# Patient Record
Sex: FEMALE | Race: ASIAN | NOT HISPANIC OR LATINO | Employment: FULL TIME | URBAN - METROPOLITAN AREA
[De-identification: names, ages, dates, MRNs, and addresses within clinical notes are randomized per-mention and may not be internally consistent; named-entity substitution may affect disease eponyms.]

---

## 2018-11-07 ENCOUNTER — OFFICE VISIT (OUTPATIENT)
Dept: INTERNAL MEDICINE CLINIC | Facility: CLINIC | Age: 53
End: 2018-11-07
Payer: COMMERCIAL

## 2018-11-07 VITALS
HEART RATE: 75 BPM | DIASTOLIC BLOOD PRESSURE: 82 MMHG | WEIGHT: 135.8 LBS | HEIGHT: 64 IN | OXYGEN SATURATION: 97 % | SYSTOLIC BLOOD PRESSURE: 100 MMHG | BODY MASS INDEX: 23.18 KG/M2

## 2018-11-07 DIAGNOSIS — M25.532 LEFT WRIST PAIN: Primary | ICD-10-CM

## 2018-11-07 DIAGNOSIS — Z12.39 SCREENING FOR MALIGNANT NEOPLASM OF BREAST: ICD-10-CM

## 2018-11-07 DIAGNOSIS — G89.29 CHRONIC LEFT-SIDED LOW BACK PAIN WITHOUT SCIATICA: ICD-10-CM

## 2018-11-07 DIAGNOSIS — Z00.00 HEALTHCARE MAINTENANCE: ICD-10-CM

## 2018-11-07 DIAGNOSIS — M54.50 CHRONIC LEFT-SIDED LOW BACK PAIN WITHOUT SCIATICA: ICD-10-CM

## 2018-11-07 PROCEDURE — 99386 PREV VISIT NEW AGE 40-64: CPT | Performed by: NURSE PRACTITIONER

## 2018-11-07 PROCEDURE — 99204 OFFICE O/P NEW MOD 45 MIN: CPT | Performed by: NURSE PRACTITIONER

## 2018-11-07 PROCEDURE — 3008F BODY MASS INDEX DOCD: CPT | Performed by: NURSE PRACTITIONER

## 2018-11-07 RX ORDER — PHENOL 1.4 %
600 AEROSOL, SPRAY (ML) MUCOUS MEMBRANE 2 TIMES DAILY WITH MEALS
COMMUNITY

## 2018-11-07 NOTE — PROGRESS NOTES
Assessment/Plan:       Diagnoses and all orders for this visit:    Left wrist pain  -     diclofenac sodium (VOLTAREN) 1 %; Apply 2 g topically 4 (four) times a day    Screening for malignant neoplasm of breast  -     Mammo screening bilateral w cad; Future    Chronic left-sided low back pain without sciatica  -     Ambulatory referral to Physical Therapy; Future    Healthcare maintenance    Other orders  -     calcium carbonate (OS-ARMANDO) 600 MG tablet; Take 600 mg by mouth 2 (two) times a day with meals  -     Glucosamine-Chondroitin (MOVE FREE PO); Take by mouth        Start physical therapy  Use voltaren cream for pain  nsaid prn      Subjective:      Patient ID: Shantel Boss is a 47 y o  female  2 days of left wrist swelling and pain from carrying and lifting at work    Also co heartburn/acid indigestion    Knee pain from playing badminton as well as lower back/right buttock pain    She does stretch before playing badminton        The following portions of the patient's history were reviewed and updated as appropriate: allergies, current medications, past family history, past medical history, past social history, past surgical history and problem list     Review of Systems   Constitutional: Negative  HENT: Negative  Eyes: Negative  Respiratory: Negative  Cardiovascular: Negative  Gastrointestinal: Negative  Musculoskeletal: Negative  Neurological: Negative  History reviewed  No pertinent family history  History reviewed  No pertinent past medical history  Social History     Social History    Marital status: Unknown     Spouse name: N/A    Number of children: N/A    Years of education: N/A     Occupational History    Not on file       Social History Main Topics    Smoking status: Never Smoker    Smokeless tobacco: Never Used    Alcohol use No    Drug use: No    Sexual activity: Not on file     Other Topics Concern    Not on file     Social History Narrative    No narrative on file       Current Outpatient Prescriptions:     calcium carbonate (OS-ARMANDO) 600 MG tablet, Take 600 mg by mouth 2 (two) times a day with meals, Disp: , Rfl:     Glucosamine-Chondroitin (MOVE FREE PO), Take by mouth, Disp: , Rfl:     diclofenac sodium (VOLTAREN) 1 %, Apply 2 g topically 4 (four) times a day, Disp: 1 Tube, Rfl: 0  Allergies   Allergen Reactions    Sulfa Antibiotics Rash     Objective:      /82 (BP Location: Right arm, Patient Position: Sitting, Cuff Size: Adult)   Pulse 75   Ht 5' 3 5" (1 613 m) Comment: with shoes  Wt 61 6 kg (135 lb 12 8 oz)   SpO2 97%   BMI 23 68 kg/m²          Physical Exam   Constitutional: She is oriented to person, place, and time  She appears well-developed and well-nourished  HENT:   Head: Normocephalic and atraumatic  Eyes: Pupils are equal, round, and reactive to light  Conjunctivae are normal    Neck: Normal range of motion  Neck supple  Cardiovascular: Normal rate and regular rhythm  Pulmonary/Chest: Effort normal and breath sounds normal    Abdominal: Soft  Bowel sounds are normal    Musculoskeletal: Normal range of motion  Neurological: She is alert and oriented to person, place, and time  Skin: Skin is warm and dry

## 2018-11-09 NOTE — PATIENT INSTRUCTIONS
Carpal Tunnel Syndrome Exercises   WHAT YOU NEED TO KNOW:   What do I need to know about carpal tunnel syndrome (CTS) exercises? CTS exercises can help relieve pain and increase your range of motion  Your healthcare provider or physical therapist can tell you how often to do the exercises  What exercises can I do? · Finger extensions:  Hold your fingers and thumb close together  Keep them straight  Put a rubber band around the outside of your fingers and thumb  Spread your fingers apart  Then slowly bring them together without letting the rubber band fall off  Repeat 40 times  · Wrist flexor stretch:  Hold your arm out straight  Grasp your fingers with your other hand  Slowly bend the fingers back (palm facing away) until you feel a stretch in your wrist  Hold for 10 seconds  Repeat 5 times  · Wrist extensor stretch:  Hold your arm out straight  Grasp your fingers with your other hand  Slowly bend the fingers and hand down (palm facing you) until you feel a stretch on top of your hand  Hold for 10 seconds  Repeat 5 times  · Wrist curls without weights:  Sit in a chair with your forearm resting on your thigh or a table  With your palm facing down, flex your wrist up 3 inches and slowly lower it  Turn your forearm over and repeat with your palm facing up  Do each exercise 20 times  · Shrugs:  Stand with your arms by your side  Lift your shoulders up to your ears and hold for 1 second  Then pull your shoulders back, pinching your shoulder blades together  Hold for 1 second  Then relax your shoulders  Repeat 20 times  CARE AGREEMENT:   You have the right to help plan your care  Learn about your health condition and how it may be treated  Discuss treatment options with your caregivers to decide what care you want to receive  You always have the right to refuse treatment  The above information is an  only   It is not intended as medical advice for individual conditions or treatments  Talk to your doctor, nurse or pharmacist before following any medical regimen to see if it is safe and effective for you  © 2017 [facility] Information is for End User's use only and may not be sold, redistributed or otherwise used for commercial purposes  All illustrations and images included in CareNotes® are the copyrighted property of A D A M , Inc  or [employer] Mur[employer]

## 2018-11-09 NOTE — PROGRESS NOTES
NAME: Zackery Jiang  AGE: 47 y o  SEX: female  : 1965     DATE: 18    Assessment and Plan     Problem List Items Addressed This Visit     None      Visit Diagnoses     Left wrist pain    -  Primary    Relevant Medications    diclofenac sodium (VOLTAREN) 1 %    Screening for malignant neoplasm of breast        Relevant Orders    Mammo screening bilateral w cad    Chronic left-sided low back pain without sciatica        Relevant Orders    Ambulatory referral to Physical Therapy    Healthcare maintenance                · Patient Counseling:   · Nutrition: Stressed importance of a well balanced diet, moderation of sodium/saturated fat, caloric balance and sufficient intake of fiber  · Dental Health: Discussed daily flossing and brushing and regular dental visits     · Immunizations reviewed  · Discussed benefits of screening   · Discussed the patient's BMI with him    The BMI is in the acceptable range     rto one year    Chief Complaint     Chief Complaint   Patient presents with    Establish Care     New patient    Hand Pain     Pt reports L hand/wrist inflammation/swelling    Fall     Pt reports fall and now there is bump on L buttock area         History of Present Illness     HPI    Well Adult Physical   Patient here for a comprehensive physical exam       Diet and Physical Activity  Diet: well balanced diet  Weight concerns: None, patient's BMI is between 18 5-24 9  Exercise: daily      Depression Screen  PHQ-9 Depression Screening    PHQ-9:    Frequency of the following problems over the past two weeks:       Little interest or pleasure in doing things:  0 - not at all  Feeling down, depressed, or hopeless:  0 - not at all  PHQ-2 Score:  0         General Health  Hearing: Normal:  bilateral  Vision: no vision problems  Dental: regular dental visits        The following portions of the patient's history were reviewed and updated as appropriate: allergies, current medications, past family history, past medical history, past social history, past surgical history and problem list     Review of Systems   Review of Systems   Constitutional: Negative  HENT: Negative  Eyes: Negative  Respiratory: Negative  Cardiovascular: Negative  Gastrointestinal: Negative  Musculoskeletal: Negative  Neurological: Negative  Past Medical History   History reviewed  No pertinent past medical history  Past Surgical History     Past Surgical History:   Procedure Laterality Date     SECTION      POLYPECTOMY      Sinus       Social History     Social History     Social History    Marital status: Unknown     Spouse name: N/A    Number of children: N/A    Years of education: N/A     Social History Main Topics    Smoking status: Never Smoker    Smokeless tobacco: Never Used    Alcohol use No    Drug use: No    Sexual activity: Not Asked     Other Topics Concern    None     Social History Narrative    None         Family History   History reviewed  No pertinent family history  Current Medications     Current Outpatient Prescriptions:     calcium carbonate (OS-ARMANDO) 600 MG tablet, Take 600 mg by mouth 2 (two) times a day with meals, Disp: , Rfl:     Glucosamine-Chondroitin (MOVE FREE PO), Take by mouth, Disp: , Rfl:     diclofenac sodium (VOLTAREN) 1 %, Apply 2 g topically 4 (four) times a day, Disp: 1 Tube, Rfl: 0    Allergies     Allergies   Allergen Reactions    Sulfa Antibiotics Rash       Objective     Vitals:    18 1136   BP: 100/82   Pulse: 75   SpO2: 97%       Physical Exam  Physical Exam   Constitutional: She is oriented to person, place, and time  She appears well-developed and well-nourished  HENT:   Head: Normocephalic and atraumatic  Eyes: Pupils are equal, round, and reactive to light  Conjunctivae are normal    Neck: Normal range of motion  Neck supple  Cardiovascular: Normal rate and regular rhythm      Pulmonary/Chest: Effort normal and breath sounds normal    Abdominal: Soft  Bowel sounds are normal    Neurological: She is alert and oriented to person, place, and time  Skin: Skin is warm and dry  Nursing note and vitals reviewed  Health Maintenance     Health Maintenance   Topic    CRC Screening: Colonoscopy     DTaP,Tdap,and Td Vaccines (1 - Tdap)    PAP SMEAR     Depression Screening PHQ     INFLUENZA VACCINE        Immunization status: up to date and documented    Immunization History   Administered Date(s) Administered    Influenza 10/15/2018

## 2018-12-04 DIAGNOSIS — M25.532 LEFT WRIST PAIN: ICD-10-CM

## 2018-12-12 ENCOUNTER — EVALUATION (OUTPATIENT)
Dept: PHYSICAL THERAPY | Facility: CLINIC | Age: 53
End: 2018-12-12
Payer: COMMERCIAL

## 2018-12-12 DIAGNOSIS — M54.50 CHRONIC LEFT-SIDED LOW BACK PAIN WITHOUT SCIATICA: Primary | ICD-10-CM

## 2018-12-12 DIAGNOSIS — G89.29 CHRONIC LEFT-SIDED LOW BACK PAIN WITHOUT SCIATICA: Primary | ICD-10-CM

## 2018-12-12 PROCEDURE — G8991 OTHER PT/OT GOAL STATUS: HCPCS | Performed by: PHYSICAL THERAPIST

## 2018-12-12 PROCEDURE — G8990 OTHER PT/OT CURRENT STATUS: HCPCS | Performed by: PHYSICAL THERAPIST

## 2018-12-12 PROCEDURE — 97110 THERAPEUTIC EXERCISES: CPT | Performed by: PHYSICAL THERAPIST

## 2018-12-12 PROCEDURE — 97161 PT EVAL LOW COMPLEX 20 MIN: CPT | Performed by: PHYSICAL THERAPIST

## 2018-12-12 PROCEDURE — 97535 SELF CARE MNGMENT TRAINING: CPT | Performed by: PHYSICAL THERAPIST

## 2018-12-12 NOTE — PROGRESS NOTES
PT Evaluation     Today's date: 2018  Patient name: Pattie Aleman  : 1965  MRN: [id number]  Referring provider: JOSÉ MIGUEL Márquez  Dx:   Encounter Diagnosis     ICD-10-CM    1  Chronic left-sided low back pain without sciatica M54 5 Ambulatory referral to Physical Therapy    G89 29                   Assessment  Assessment details: The patient presents with s/s consistent with low back pain with a core stabilization preference  The patient demonstrates poor core strength, activation, and endurance, limited lumbar vertebral mobility, and pain at end range hip and lumbar ROM  The patient would benefit from skilled therapy to address her deficits through neuro-retraining and strengthening of the core muscles, lumbar/ right hip stretching, lumbar mobilizations, IASTM to b/L QL and high level laser for pain management as needed  Impairments: abnormal muscle tone, abnormal or restricted ROM, impaired physical strength, pain with function and poor posture   Understanding of Dx/Px/POC: fair   Prognosis: fair    Goals  STG: To be completed in 4 weeks  1  The patient will report no more than 4/10 pain during all adls  2  The patient is compliant with her HEP  3  The patient will increase LE strength to 4/5 MMT when standing for 30 minutes or more  LTG: To be completed in 8 weeks    1  The patient will report no more than 1/10 pain during all adls  2  The patient will play a game of [employer] without pain higher than a 1/10 in the low back  3  The patient will increase LE strength to 4+/5 MMT when standing for more than 1 hour         Plan  Patient would benefit from: skilled physical therapy  Planned modality interventions: thermotherapy: hydrocollator packs and cryotherapy  Other planned modality interventions: high level laser  Planned therapy interventions: joint mobilization, manual therapy, neuromuscular re-education, patient education, postural training, strengthening, stretching, therapeutic activities, therapeutic exercise, therapeutic training and home exercise program  Frequency: 2x week  Duration in weeks: 8  Plan of Care beginning date: 2018  Plan of Care expiration date: 2019  Treatment plan discussed with: patient        Subjective Evaluation    History of Present Illness  Date of onset: 2018  Mechanism of injury: Shantel Boss is a 47 y o  female who presents with chronic left-sided low back pain for the past 7-8 years with a recent exacerbation after falling while playing [employer]  The patient denies parasthesias or trouble sleeping at night  The patient currently struggles with standing for more than one hour, bending backward, and playing [employer]  PMH is insignificant         Recurrent probem    Quality of life: fair    Pain  Current pain ratin  At best pain ratin  At worst pain ratin  Quality: dull ache  Relieving factors: rest and medications  Aggravating factors: standing  Progression: worsening      Diagnostic Tests  No diagnostic tests performed  Treatments  Previous treatment: chiropractic  Current treatment: physical therapy  Patient Goals  Patient goals for therapy: return to sport/leisure activities          Objective     Neurological Testing     Sensation     Lumbar   Left   Intact: light touch    Right   Intact: light touch    Active Range of Motion     Lumbar   Extension: [facility]    Additional Active Range of Motion Details  Lumbar:  Flexion- WFL  R Lateral Flexion- fingertips 6 cm above fibular head*  L Lateral Flexion- fingertips 2 cm above fibular head    *Pain noted    Passive Range of Motion     Additional Passive Range of Motion Details  Hamstring: L normal R pain during last 10 degrees  Piriformis: mild tightness b/L     Strength/Myotome Testing     Left Hip   Planes of Motion   Flexion: 4-  Abduction: 4-  Adduction: 4    Right Hip   Planes of Motion   Flexion: 4-  Abduction: 4-  Adduction: 4    Left Knee   Flexion: 4+  Extension: 4+    Right Knee Flexion: 4+  Extension: 4+    Left Ankle/Foot   Dorsiflexion: 5    Right Ankle/Foot   Dorsiflexion: 5    Muscle Activation     Additional Muscle Activation Details  Abd: 4/5 MMT  Abd Endurance: 6 seconds    Tests     Additional Tests Details  Lumbar vertebrae: L3-S1 hypomobile          Precautions: none    Daily Treatment Diary     Manual              IASTM L QL             L2-S1 Gr III Mobs                                                         Exercise Diary  12/12            TA Set 10"x10            TA set c Marches 1x10ea            Bridges 1x15            Clamshells             R LTR 10"x5            Reverse Pball             S/L SLR             Step-Ups  8"/                                                                                                                                                                           Modalities

## 2018-12-14 ENCOUNTER — HOSPITAL ENCOUNTER (OUTPATIENT)
Dept: RADIOLOGY | Age: 53
Discharge: HOME | End: 2018-12-14
Payer: COMMERCIAL

## 2018-12-14 VITALS — BODY MASS INDEX: 23.92 KG/M2 | WEIGHT: 130 LBS | HEIGHT: 62 IN

## 2018-12-14 DIAGNOSIS — Z12.39 SCREENING FOR MALIGNANT NEOPLASM OF BREAST: ICD-10-CM

## 2018-12-14 PROCEDURE — 77067 SCR MAMMO BI INCL CAD: CPT

## 2018-12-20 ENCOUNTER — OFFICE VISIT (OUTPATIENT)
Dept: PHYSICAL THERAPY | Facility: CLINIC | Age: 53
End: 2018-12-20
Payer: COMMERCIAL

## 2018-12-20 DIAGNOSIS — G89.29 CHRONIC LEFT-SIDED LOW BACK PAIN WITHOUT SCIATICA: Primary | ICD-10-CM

## 2018-12-20 DIAGNOSIS — M54.50 CHRONIC LEFT-SIDED LOW BACK PAIN WITHOUT SCIATICA: Primary | ICD-10-CM

## 2018-12-20 PROCEDURE — 97112 NEUROMUSCULAR REEDUCATION: CPT | Performed by: PHYSICAL THERAPIST

## 2018-12-20 PROCEDURE — 97110 THERAPEUTIC EXERCISES: CPT | Performed by: PHYSICAL THERAPIST

## 2018-12-20 NOTE — PROGRESS NOTES
Daily Note     Today's date: 2018  Patient name: Dustin Rodriguez  : 1965  MRN: [id number]  Referring provider: JOSÉ MIGUEL Pinto  Dx:   Encounter Diagnosis     ICD-10-CM    1  Chronic left-sided low back pain without sciatica M54 5     G89 29                   Subjective: The patient reports stiffness and soreness in the left lumbar region this morning  She reports compliance with her HEP  Objective: See treatment diary below      Assessment: Patient demonstrated good tolerance for core strengthening exercises  Moderate tone noted in left QL during manuals  Plan: Continue per plan of care  Progress treatment as tolerated          Precautions: none    Daily Treatment Diary     Manual              IASTM L QL  8 min           L2-S1 Gr III Mobs   1x20 ea                                                      Exercise Diary             TA Set 10"x10 10"x15           TA set c Marches 1x10ea 2x12 ea           Bridges 1x15 2x10           Clamshells  RTB 3x20           R LTR 10"x5 10"x5           Reverse Pball  3x10           S/L SLR  2x10           B Step-Ups  8"/ 2x10                                                                                                                                                                           Modalities

## 2018-12-24 ENCOUNTER — OFFICE VISIT (OUTPATIENT)
Dept: PHYSICAL THERAPY | Facility: CLINIC | Age: 53
End: 2018-12-24
Payer: COMMERCIAL

## 2018-12-24 DIAGNOSIS — G89.29 CHRONIC LEFT-SIDED LOW BACK PAIN WITHOUT SCIATICA: Primary | ICD-10-CM

## 2018-12-24 DIAGNOSIS — M54.50 CHRONIC LEFT-SIDED LOW BACK PAIN WITHOUT SCIATICA: Primary | ICD-10-CM

## 2018-12-24 PROCEDURE — 97112 NEUROMUSCULAR REEDUCATION: CPT | Performed by: PHYSICAL THERAPIST

## 2018-12-24 PROCEDURE — 97110 THERAPEUTIC EXERCISES: CPT | Performed by: PHYSICAL THERAPIST

## 2018-12-24 NOTE — PROGRESS NOTES
Daily Note     Today's date: 2018  Patient name: Lore Ross  : 1965  MRN: [id number]  Referring provider: JOSÉ MIGUEL Turner  Dx:   Encounter Diagnosis     ICD-10-CM    1  Chronic left-sided low back pain without sciatica M54 5     G89 29                   Subjective: The patient reports pain in the right hip after playing tennis but no pain in the back  No pain currently  Objective: See treatment diary below      Assessment: Patient demonstrated increased endurance with core exercises  Moderate tone still noted along the left QL  Plan: Continue per plan of care  Progress treatment as tolerated          Precautions: none    Daily Treatment Diary     Manual             IASTM L QL  8 min 8 min          L2-S1 Gr III Mobs   1x20 ea 1x20 ea                                                     Exercise Diary            TA Set 10"x10 10"x15 10"x15          TA set c Marches 1x10ea 2x12 ea 3x10 ea          Bridges 1x15 2x10 3x10          Clamshells  RTB 3x10 RTB 3x12          R LTR 10"x5 10"x5 10"x5          Reverse Pball  3x10 3x10          S/L SLR  2x10 3x10          B Step-Ups  8"/ 2x10 8" 3x10                                                                                                                                                                          Modalities

## 2019-01-14 ENCOUNTER — OFFICE VISIT (OUTPATIENT)
Dept: PHYSICAL THERAPY | Facility: CLINIC | Age: 54
End: 2019-01-14
Payer: COMMERCIAL

## 2019-01-14 DIAGNOSIS — M54.50 CHRONIC LEFT-SIDED LOW BACK PAIN WITHOUT SCIATICA: Primary | ICD-10-CM

## 2019-01-14 DIAGNOSIS — G89.29 CHRONIC LEFT-SIDED LOW BACK PAIN WITHOUT SCIATICA: Primary | ICD-10-CM

## 2019-01-14 PROCEDURE — 97110 THERAPEUTIC EXERCISES: CPT | Performed by: PHYSICAL THERAPIST

## 2019-01-14 PROCEDURE — 97140 MANUAL THERAPY 1/> REGIONS: CPT | Performed by: PHYSICAL THERAPIST

## 2019-01-14 PROCEDURE — 97112 NEUROMUSCULAR REEDUCATION: CPT | Performed by: PHYSICAL THERAPIST

## 2019-01-14 NOTE — PROGRESS NOTES
Daily Note     Today's date: 2019  Patient name: Perla Patino  : 1965  MRN: [id number]  Referring provider: JOSÉ MIGUEL James  Dx:   Encounter Diagnosis     ICD-10-CM    1  Chronic left-sided low back pain without sciatica M54 5     G89 29                   Subjective: The patient returns to PT after a two weeks hiatus while on vacation  The patient notes she twisted her ankle while away and now complains of ankle and low back pain  She reported sitting aggravated her symptoms specifically when driving  She still notes pain with playing [email or web address] and when in any position for more than one hour  Objective: See treatment diary below      Assessment: Patient exhibited moderate left QL tone  Good tolerance to strengthening progressions despite hiatus  Clamshells added to patient's HEP and theraband administered  The patient asked to decrease to 1x/wk due to time constraints  Patient educated in importance of continuing with HEP at home with decreased PT visits  Plan: Continue per plan of care  Progress treatment as tolerated          Precautions: none    Daily Treatment Diary     Manual            IASTM L QL  8 min 8 min 8 min         L2-S1 Gr III Mobs   1x20 ea 1x20 ea 1x20 ea                                                    Exercise Diary           TA Set 10"x10 10"x15 10"x15 Sit 10"x 15         TA set c Marches 1x10ea 2x12 ea 3x10 ea 3x15 ea         Bridges 1x15 2x10 3x10 3x12         Clamshells  RTB 3x10 RTB 3x12 GTB 3x10         R LTR 10"x5 10"x5 10"x5 10"x5         Reverse Pball  3x10 3x10 3x15         S/L SLR  2x10 3x10 3x10         B Step-Ups  8"/ 2x10 8" 3x10 8" 3x10                                                                                                                                                                         Modalities

## 2019-01-21 ENCOUNTER — APPOINTMENT (OUTPATIENT)
Dept: PHYSICAL THERAPY | Facility: CLINIC | Age: 54
End: 2019-01-21
Payer: COMMERCIAL

## 2019-01-23 ENCOUNTER — OFFICE VISIT (OUTPATIENT)
Dept: PHYSICAL THERAPY | Facility: CLINIC | Age: 54
End: 2019-01-23
Payer: COMMERCIAL

## 2019-01-23 DIAGNOSIS — M54.50 CHRONIC LEFT-SIDED LOW BACK PAIN WITHOUT SCIATICA: Primary | ICD-10-CM

## 2019-01-23 DIAGNOSIS — G89.29 CHRONIC LEFT-SIDED LOW BACK PAIN WITHOUT SCIATICA: Primary | ICD-10-CM

## 2019-01-23 PROCEDURE — 97110 THERAPEUTIC EXERCISES: CPT | Performed by: PHYSICAL THERAPIST

## 2019-01-23 PROCEDURE — 97112 NEUROMUSCULAR REEDUCATION: CPT | Performed by: PHYSICAL THERAPIST

## 2019-01-23 PROCEDURE — 97140 MANUAL THERAPY 1/> REGIONS: CPT | Performed by: PHYSICAL THERAPIST

## 2019-01-23 NOTE — PROGRESS NOTES
Daily Note     Today's date: 2019  Patient name: Bridgette Humphreys  : 1965  MRN: [id number]  Referring provider: JOSÉ MIGUEL Khoury  Dx:   Encounter Diagnosis     ICD-10-CM    1  Chronic left-sided low back pain without sciatica M54 5     G89 29                   Subjective: The patient reports no pain currently noting improvement in back symptoms over the past week  The patient reports mild pain/soreness yesterday but decreased pain overall  Objective: See treatment diary below      Assessment: Patient demonstrated fair lumbopelvic stability during quadruped exercise and increased endurance during exercise progressions today  Moderate tone still noted along left QL  Plan: Continue per plan of care  Progress treatment as tolerated          Precautions: none    Daily Treatment Diary     Manual           IASTM L QL  8 min 8 min 8 min 7 min        L2-S1 Gr III Mobs   1x20 ea 1x20 ea 1x20 ea 1x20 ea                                                   Exercise Diary          TA Set 10"x10 10"x15 10"x15 Sit 10"x 15 Sit 10"x15        TA set c Marches 1x10ea 2x12 ea 3x10 ea 3x15 ea 1#/ 3x10        Bridges 1x15 2x10 3x10 3x12 3x15        Clamshells  RTB 3x10 RTB 3x12 GTB 3x10 S/L GTB 3x15        R LTR 10"x5 10"x5 10"x5 10"x5 10"x5        Reverse Pball  3x10 3x10 3x15 3x18 D/C       S/L SLR  2x10 3x10 3x10 3x10        B Step-Ups  8"/ 2x10 8" 3x10 8" 3x10 8" 3x10        Quadruped: alt legs     1x12 ea                                                                                                                                                           Modalities

## 2019-01-28 ENCOUNTER — OFFICE VISIT (OUTPATIENT)
Dept: PHYSICAL THERAPY | Facility: CLINIC | Age: 54
End: 2019-01-28
Payer: COMMERCIAL

## 2019-01-28 DIAGNOSIS — M54.50 CHRONIC LEFT-SIDED LOW BACK PAIN WITHOUT SCIATICA: Primary | ICD-10-CM

## 2019-01-28 DIAGNOSIS — G89.29 CHRONIC LEFT-SIDED LOW BACK PAIN WITHOUT SCIATICA: Primary | ICD-10-CM

## 2019-01-28 PROCEDURE — 97112 NEUROMUSCULAR REEDUCATION: CPT | Performed by: PHYSICAL THERAPIST

## 2019-01-28 PROCEDURE — 97140 MANUAL THERAPY 1/> REGIONS: CPT | Performed by: PHYSICAL THERAPIST

## 2019-01-28 PROCEDURE — 97110 THERAPEUTIC EXERCISES: CPT | Performed by: PHYSICAL THERAPIST

## 2019-01-28 NOTE — PROGRESS NOTES
Daily Note     Today's date: 2019  Patient name: Lida Guerrero  : 1965  MRN: [id number]  Referring provider: JOSÉ MIGUEL Coffey  Dx:   Encounter Diagnosis     ICD-10-CM    1  Chronic left-sided low back pain without sciatica M54 5     G89 29                   Subjective: The patient reports no pain but mild stiffness in the left QL and glute  She noted stiffness and soreness yesterday while playing [employer]  Objective: See treatment diary below      Assessment: Patient demonstrated improved lumbopelvic stability with quadruped exercise and good tolerance to new core strengthening exercises  The patient's left QL exhibited improved tone compared to last visit but remains tight  Plan: Continue per plan of care  Progress treatment as tolerated          Precautions: none    Daily Treatment Diary     Manual          IASTM L QL  8 min 8 min 8 min 7 min 7 min       L2-S1 Gr III Mobs   1x20 ea 1x20 ea 1x20 ea 1x20 ea 1x20 ea                                                  Exercise Diary         TA Set 10"x10 10"x15 10"x15 Sit 10"x 15 Sit 10"x15 Sit 10"x15       TA set c Marches 1x10ea 2x12 ea 3x10 ea 3x15 ea 1#/ 3x10 1#/ 3x12       Bridges 1x15 2x10 3x10 3x12 3x15 3x15       Clamshells  RTB 3x10 RTB 3x12 GTB 3x10 S/L GTB 3x15 S/L GTB 3x15       R LTR 10"x5 10"x5 10"x5 10"x5 10"x5 10"x5       Reverse Pball  3x10 3x10 3x15 3x18 D/C       S/L SLR  2x10 3x10 3x10 3x10 3x12       B Step-Ups  8"/ 2x10 8" 3x10 8" 3x10 8" 3x10 8" 3x12       Quadruped: alt legs     1x12 ea 2x10 ea       Back Ext Machine      40# 3x10                                                                                                                                             Modalities

## 2019-02-04 ENCOUNTER — OFFICE VISIT (OUTPATIENT)
Dept: PHYSICAL THERAPY | Facility: CLINIC | Age: 54
End: 2019-02-04
Payer: COMMERCIAL

## 2019-02-04 DIAGNOSIS — G89.29 CHRONIC LEFT-SIDED LOW BACK PAIN WITHOUT SCIATICA: Primary | ICD-10-CM

## 2019-02-04 DIAGNOSIS — M54.50 CHRONIC LEFT-SIDED LOW BACK PAIN WITHOUT SCIATICA: Primary | ICD-10-CM

## 2019-02-04 PROCEDURE — 97110 THERAPEUTIC EXERCISES: CPT | Performed by: PHYSICAL THERAPIST

## 2019-02-04 PROCEDURE — 97112 NEUROMUSCULAR REEDUCATION: CPT | Performed by: PHYSICAL THERAPIST

## 2019-02-04 PROCEDURE — 97140 MANUAL THERAPY 1/> REGIONS: CPT | Performed by: PHYSICAL THERAPIST

## 2019-02-11 ENCOUNTER — APPOINTMENT (OUTPATIENT)
Dept: PHYSICAL THERAPY | Facility: CLINIC | Age: 54
End: 2019-02-11
Payer: COMMERCIAL

## 2019-02-13 ENCOUNTER — EVALUATION (OUTPATIENT)
Dept: PHYSICAL THERAPY | Facility: CLINIC | Age: 54
End: 2019-02-13
Payer: COMMERCIAL

## 2019-02-13 DIAGNOSIS — M54.50 CHRONIC LEFT-SIDED LOW BACK PAIN WITHOUT SCIATICA: Primary | ICD-10-CM

## 2019-02-13 DIAGNOSIS — G89.29 CHRONIC LEFT-SIDED LOW BACK PAIN WITHOUT SCIATICA: Primary | ICD-10-CM

## 2019-02-13 PROCEDURE — 97112 NEUROMUSCULAR REEDUCATION: CPT | Performed by: PHYSICAL THERAPIST

## 2019-02-13 PROCEDURE — 97110 THERAPEUTIC EXERCISES: CPT | Performed by: PHYSICAL THERAPIST

## 2019-02-13 NOTE — PROGRESS NOTES
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 and less pain and stiffness while playing [email or web address]  She still demonstrates core and LE weakness, mild tone in the QL, right glute tightness, and pain with functional activities  The patient would benefit from skilled therapy to address her deficits through neuro-retraining and strengthening of the core muscles, lumbar/ right hip stretching, lumbar mobilizations, IASTM to b/L QL and high level laser for pain management as needed  Impairments: abnormal muscle tone, abnormal or restricted ROM, impaired physical strength, pain with function and poor posture   Understanding of Dx/Px/POC: fair   Prognosis: fair    Goals  STG: To be completed in 4 weeks  1  The patient will report no more than 4/10 pain during all adls  met  2  The patient is compliant with her HEP  met  3  The patient will increase LE strength to 4/5 MMT when standing for 30 minutes or more  met    LTG: To be completed in 8 weeks    1  The patient will report no more than 1/10 pain during all adls  2  The patient will play a game of Hightower without pain higher than a 1/10 in the low back  3  The patient will increase LE strength to 4+/5 MMT when standing for more than 1 hour         Plan  Patient would benefit from: skilled physical therapy  Planned modality interventions: thermotherapy: hydrocollator packs and cryotherapy  Other planned modality interventions: high level laser  Planned therapy interventions: joint mobilization, manual therapy, neuromuscular re-education, patient education, postural training, strengthening, stretching, therapeutic activities, therapeutic exercise, therapeutic training and home exercise program  Frequency: 1x week  Duration in weeks: 8  Plan of Care beginning date: 2019  Plan of Care expiration date: 4/10/2019  Treatment plan discussed with: patient        Subjective Evaluation    History of Present Illness  Date of onset: 2018  Mechanism of injury: Raf Maurer is a 47 y o  female who presents with chronic left-sided low back pain for the past 7-8 years with a recent exacerbation after falling while playing Joppel  The patient denies parasthesias or trouble sleeping at night  The patient currently struggles with standing for more than one hour, bending backward, and playing Joppel  PMH is insignificant                 Recurrent probem    Quality of life: fair    Pain  Current pain ratin  At best pain ratin  At worst pain ratin  Quality: dull ache  Relieving factors: rest and medications  Aggravating factors: standing  Progression: improved      Diagnostic Tests  No diagnostic tests performed  Treatments  Previous treatment: chiropractic  Current treatment: physical therapy  Patient Goals  Patient goals for therapy: return to sport/leisure activities          Objective     Neurological Testing     Sensation     Lumbar   Left   Intact: light touch    Right   Intact: light touch    Active Range of Motion     Lumbar   Extension:  [facility]    Additional Active Range of Motion Details  Lumbar:  Flexion- WFL  R Lateral Flexion- fingertips 4 cm above fibular head  L Lateral Flexion- fingertips 2 cm above fibular head        Passive Range of Motion     Additional Passive Range of Motion Details  Hamstring: L normal R pain during last 10 degrees  Piriformis: mild tightness b/L     Strength/Myotome Testing Left Hip   Planes of Motion   Flexion: 4  Abduction: 4  Adduction: 4    Right Hip   Planes of Motion   Flexion: 4  Abduction: 4  Adduction: 4    Left Knee   Flexion: 4+  Extension: 4+    Right Knee   Flexion: 4+  Extension: 4+    Left Ankle/Foot   Dorsiflexion: 5    Right Ankle/Foot   Dorsiflexion: 5    Muscle Activation     Additional Muscle Activation Details  Abd: 4+/5 MMT  Abd Endurance: 12 seconds    Tests     Additional Tests Details  Lumbar vertebrae: L3-S1 hypomobile          Precautions: none    Daily Treatment Diary     Manual   12/20 12/24 1/14 1/23 1/28 2/4 2/13     IASTM L QL  8 min 8 min 8 min 7 min 7 min 5 min 5 min     L2-S1 Gr III Mobs   1x20 ea 1x20 ea 1x20 ea 1x20 ea 1x20 ea 1x15 ea 1x15 ea      Foam Roller: L glute       3 min 5 min                                   Exercise Diary  12/12 12/20 12/24 1/14 1/23 1/28 2/4 2/13     TA Set 10"x10 10"x15 10"x15 Sit 10"x 15 Sit 10"x15 Sit 10"x15 Sit 10"x10 Sit 10"x10     TA set c Marches 1x10ea 2x12 ea 3x10 ea 3x15 ea 1#/ 3x10 1#/ 3x12 1#/ 3x15 Dead bugs 1#/ 3x10     Bridges 1x15 2x10 3x10 3x12 3x15 3x15 3x18 3x18     Clamshells  RTB 3x10 RTB 3x12 GTB 3x10 S/L GTB 3x15 S/L GTB 3x15 S/L GTB 3x15 S/L GTB 3x18     R LTR 10"x5 10"x5 10"x5 10"x5 10"x5 10"x5 10"x5 10"x5     Reverse Pball  3x10 3x10 3x15 3x18 D/C       S/L SLR  2x10 3x10 3x10 3x10 3x12 3x12 3x12     B Step-Ups  8"/ 2x10 8" 3x10 8" 3x10 8" 3x10 8" 3x12 10" 2x10 10" 2x10     Quadruped: alt legs     1x12 ea 2x10 ea 2x10 ea 3x10 ea     Back Ext Machine      40# 3x10 40# 3x10 45# 3x10                                                                                                                                           Modalities

## 2019-02-18 ENCOUNTER — OFFICE VISIT (OUTPATIENT)
Dept: PHYSICAL THERAPY | Facility: CLINIC | Age: 54
End: 2019-02-18
Payer: COMMERCIAL

## 2019-02-18 DIAGNOSIS — G89.29 CHRONIC LEFT-SIDED LOW BACK PAIN WITHOUT SCIATICA: Primary | ICD-10-CM

## 2019-02-18 DIAGNOSIS — M54.50 CHRONIC LEFT-SIDED LOW BACK PAIN WITHOUT SCIATICA: Primary | ICD-10-CM

## 2019-02-18 PROCEDURE — 97110 THERAPEUTIC EXERCISES: CPT | Performed by: PHYSICAL THERAPIST

## 2019-02-18 PROCEDURE — 97112 NEUROMUSCULAR REEDUCATION: CPT | Performed by: PHYSICAL THERAPIST

## 2019-02-25 ENCOUNTER — OFFICE VISIT (OUTPATIENT)
Dept: PHYSICAL THERAPY | Facility: CLINIC | Age: 54
End: 2019-02-25
Payer: COMMERCIAL

## 2019-02-25 DIAGNOSIS — G89.29 CHRONIC LEFT-SIDED LOW BACK PAIN WITHOUT SCIATICA: Primary | ICD-10-CM

## 2019-02-25 DIAGNOSIS — M54.50 CHRONIC LEFT-SIDED LOW BACK PAIN WITHOUT SCIATICA: Primary | ICD-10-CM

## 2019-02-25 PROCEDURE — 97112 NEUROMUSCULAR REEDUCATION: CPT | Performed by: PHYSICAL THERAPIST

## 2019-02-25 PROCEDURE — 97110 THERAPEUTIC EXERCISES: CPT | Performed by: PHYSICAL THERAPIST

## 2019-02-25 NOTE — PROGRESS NOTES
Daily Note     Today's date: 2019  Patient name: Rashad Basurto  : 1965  MRN: [id number]  Referring provider: JOSÉ MIGUEL Terrell  Dx:   Encounter Diagnosis     ICD-10-CM    1  Chronic left-sided low back pain without sciatica M54 5     G89 29                   Subjective: The patient reports no pain while playing badminton this past week and only two episodes of pain while sitting for longer periods of time  Objective: See treatment diary below      Assessment: Reviewed the importance of maintaining a TA set and good posture while sitting  Good response to core progressions and improved core endurance noted during exercises  Plan: Continue per plan of care  Progress treatment as tolerated              Precautions: none    Daily Treatment Diary     Manual      IASTM L QL  8 min 8 min 8 min 7 min 7 min 5 min 5 min 5 min 5 min   L2-S1 Gr III Mobs   1x20 ea 1x20 ea 1x20 ea 1x20 ea 1x20 ea 1x15 ea 1x15 ea  1x15 ea 1x15 ea   Foam Roller: L glute       3 min 5 min 5 min 5 min                                 Exercise Diary     TA Set 10"x10 10"x15 10"x15 Sit 10"x 15 Sit 10"x15 Sit 10"x15 Sit 10"x10 Sit 10"x10 Sit 10"x10 Sit 10"x15   TA set c Marches 1x10ea 2x12 ea 3x10 ea 3x15 ea 1#/ 3x10 1#/ 3x12 1#/ 3x15 Dead bugs 1#/ 3x10 Dead bugs 1#/ 3x10 Dead bugs 1#/ 3x12   Bridges 1x15 2x10 3x10 3x12 3x15 3x15 3x18 3x18 3x18 3x20   Clamshells  RTB 3x10 RTB 3x12 GTB 3x10 S/L GTB 3x15 S/L GTB 3x15 S/L GTB 3x15 S/L GTB 3x18 S/L GTB 3x18 S/L GTB 3x18   R LTR 10"x5 10"x5 10"x5 10"x5 10"x5 10"x5 10"x5 10"x5 10"x5 10"x5   Reverse Pball  3x10 3x10 3x15 3x18 D/C       S/L SLR  2x10 3x10 3x10 3x10 3x12 3x12 3x12 3x15 3xx15   B Step-Ups  8"/ 2x10 8" 3x10 8" 3x10 8" 3x10 8" 3x12 10" 2x10 10" 2x10 10" 2x12 10" 2x12   Quadruped: alt legs     1x12 ea 2x10 ea 2x10 ea 3x10 ea 3x10 ea 3x10 ea   Back Ext Machine      40# 3x10 40# 3x10 45# 3x10 50# 3x10 50# 3x10   L Piriformis St           20"x3 20"x3                                                                                                                            Modalities

## 2019-03-04 ENCOUNTER — OFFICE VISIT (OUTPATIENT)
Dept: PHYSICAL THERAPY | Facility: CLINIC | Age: 54
End: 2019-03-04
Payer: COMMERCIAL

## 2019-03-04 DIAGNOSIS — M54.50 CHRONIC LEFT-SIDED LOW BACK PAIN WITHOUT SCIATICA: Primary | ICD-10-CM

## 2019-03-04 DIAGNOSIS — G89.29 CHRONIC LEFT-SIDED LOW BACK PAIN WITHOUT SCIATICA: Primary | ICD-10-CM

## 2019-03-04 PROCEDURE — 97110 THERAPEUTIC EXERCISES: CPT | Performed by: PHYSICAL THERAPIST

## 2019-03-04 PROCEDURE — 97112 NEUROMUSCULAR REEDUCATION: CPT | Performed by: PHYSICAL THERAPIST

## 2019-03-04 NOTE — PROGRESS NOTES
Daily Note     Today's date: 3/4/2019  Patient name: Lore Ross  : 1965  MRN: [id number]  Referring provider: JOSÉ MIGUEL Turner  Dx:   Encounter Diagnosis     ICD-10-CM    1  Chronic left-sided low back pain without sciatica M54 5     G89 29                   Subjective: The patient reports soreness in the left ITB and piriformis but no pain over the past week  Objective: See treatment diary below      Assessment: Patient demonstrated mild to moderate QL tone today  Patient continues to steadily increase core stability and notes less incidents of pain each week  Plan: Continue per plan of care  Progress treatment as tolerated              Precautions: none    Daily Treatment Diary     Manual  3/4            IASTM L QL 5 min            L2-S1 Gr III Mobs  1x15 ea            Foam Roller: L glute 5 min                                          Exercise Diary  3/4            TA Set Sit 10"x10            Dead bugs 1#/ 3x12            Bridges 3x20            Clamshells S/L GTB 3x18            R LTR 10"x5            L Piriformis St  20"x3            S/L SLR 3x15            B Step-Ups 10" 2x12            Quadruped: alt UE/LE 3x10            Back Ext Machine 50# 3x12                                                                                                                                                  Modalities

## 2019-03-12 ENCOUNTER — OFFICE VISIT (OUTPATIENT)
Dept: PHYSICAL THERAPY | Facility: CLINIC | Age: 54
End: 2019-03-12
Payer: COMMERCIAL

## 2019-03-12 DIAGNOSIS — M54.50 CHRONIC LEFT-SIDED LOW BACK PAIN WITHOUT SCIATICA: Primary | ICD-10-CM

## 2019-03-12 DIAGNOSIS — G89.29 CHRONIC LEFT-SIDED LOW BACK PAIN WITHOUT SCIATICA: Primary | ICD-10-CM

## 2019-03-12 PROCEDURE — 97530 THERAPEUTIC ACTIVITIES: CPT | Performed by: PHYSICAL THERAPIST

## 2019-03-12 PROCEDURE — 97110 THERAPEUTIC EXERCISES: CPT | Performed by: PHYSICAL THERAPIST

## 2019-03-12 PROCEDURE — 97112 NEUROMUSCULAR REEDUCATION: CPT | Performed by: PHYSICAL THERAPIST

## 2019-03-12 NOTE — PROGRESS NOTES
Daily Note     Today's date: 3/12/2019  Patient name: Brooks Taylor  : 1965  MRN: [id number]  Referring provider: JOSÉ MIGUEL Geller  Dx:   Encounter Diagnosis     ICD-10-CM    1  Chronic left-sided low back pain without sciatica M54 5     G89 29                   Subjective: The patient reports improvement in symptoms over the last week with putting a stool under her feet at work as suggested  She notes only one episode of pain and soreness on Thursday  No pain currently  Objective: See treatment diary below      Assessment: Patient demonstrated normal vertebral mobility today but moderate tone in left QL today  The patient continues to exhibit increased core strengthening translating to improved sitting tolerance at work  Plan: Continue per plan of care  Progress treatment as tolerated              Precautions: none    Daily Treatment Diary     Manual  3/4 3/11           IASTM L QL 5 min 5 min           L2-S1 Gr III Mobs  1x15 ea 1x15 ea           Foam Roller: L glute 5 min 5 min                                         Exercise Diary  3/4 3/11           TA Set Sit 10"x10 Sit 10"x10           Dead bugs 1#/ 3x12 1#/ 3x12           Bridges 3x20 3x22           S/L Clamshells  GTB 3x18 GTB 3x18           R LTR 10"x5 10"x5           L Piriformis St  20"x3 20"x3           S/L SLR 3x15 3x15           B Step-Ups 10" 2x12 10" 2x12           Quadruped: alt UE/LE 3x10 10"x10           Back Ext Machine 50# 3x12 55# 3x10                                                                                                                                                 Modalities

## 2019-03-25 ENCOUNTER — OFFICE VISIT (OUTPATIENT)
Dept: PHYSICAL THERAPY | Facility: CLINIC | Age: 54
End: 2019-03-25
Payer: COMMERCIAL

## 2019-03-25 ENCOUNTER — APPOINTMENT (OUTPATIENT)
Dept: PHYSICAL THERAPY | Facility: CLINIC | Age: 54
End: 2019-03-25
Payer: COMMERCIAL

## 2019-03-25 DIAGNOSIS — M54.50 CHRONIC LEFT-SIDED LOW BACK PAIN WITHOUT SCIATICA: Primary | ICD-10-CM

## 2019-03-25 DIAGNOSIS — G89.29 CHRONIC LEFT-SIDED LOW BACK PAIN WITHOUT SCIATICA: Primary | ICD-10-CM

## 2019-03-25 PROCEDURE — 97112 NEUROMUSCULAR REEDUCATION: CPT | Performed by: PHYSICAL THERAPIST

## 2019-03-25 PROCEDURE — 97110 THERAPEUTIC EXERCISES: CPT | Performed by: PHYSICAL THERAPIST

## 2019-03-25 NOTE — PROGRESS NOTES
Daily Note     Today's date: 3/25/2019  Patient name: Rashad Basurto  : 1965  MRN: [id number]  Referring provider: JOSÉ MIGUEL Terrell  Dx:   Encounter Diagnosis     ICD-10-CM    1  Chronic left-sided low back pain without sciatica M54 5     G89 29                   Subjective: The patient reports mild pain with a 5-6 hour plane ride but no pain over the past two weeks  She did note some stiffness in the glute after playing bad[employer] and mild soreness in the leg but no other complaints  Objective: See treatment diary below      Assessment: Patient demonstrated improve glute and QL tone and increased core endurance during strengthening exercises  The patient complained of mild right hamstring pain; she was educated on performing HS stretch at home  Discussed possible discharge in the next 1-3 visits pending symptoms; patient agreeable  Plan: Continue per plan of care  Progress treatment as tolerated              Precautions: none    Daily Treatment Diary     Manual  3/4 3/11 3/25          IASTM L QL 5 min 5 min 5 min          L2-S1 Gr III Mobs  1x15 ea 1x15 ea 1x15 ea          Foam Roller: L glute 5 min 5 min 5 min                                        Exercise Diary  3/4 3/11 3/25          TA Set Sit 10"x10 Sit 10"x10 Sit 5"x15          Dead bugs 1#/ 3x12 1#/ 3x12 1#/ 3x15          Bridges 3x20 3x22 3x22          S/L Clamshells  GTB 3x18 GTB 3x18 GTB 3x18          R LTR 10"x5 10"x5 np          L Piriformis St  20"x3 20"x3 20"x3          S/L SLR 3x15 3x15 3x15          B Step-Ups 10" 2x12 10" 2x12           Quadruped: alt UE/LE 3x10 10"x10 10"x10          Back Ext Machine 50# 3x12 55# 3x10 55# 3x10                                                                                                                                                Modalities

## 2019-04-01 ENCOUNTER — OFFICE VISIT (OUTPATIENT)
Dept: PHYSICAL THERAPY | Facility: CLINIC | Age: 54
End: 2019-04-01
Payer: COMMERCIAL

## 2019-04-01 DIAGNOSIS — M54.50 CHRONIC LEFT-SIDED LOW BACK PAIN WITHOUT SCIATICA: Primary | ICD-10-CM

## 2019-04-01 DIAGNOSIS — G89.29 CHRONIC LEFT-SIDED LOW BACK PAIN WITHOUT SCIATICA: Primary | ICD-10-CM

## 2019-04-01 PROCEDURE — 97112 NEUROMUSCULAR REEDUCATION: CPT | Performed by: PHYSICAL THERAPIST

## 2019-04-01 PROCEDURE — 97110 THERAPEUTIC EXERCISES: CPT | Performed by: PHYSICAL THERAPIST

## 2019-04-03 ENCOUNTER — APPOINTMENT (OUTPATIENT)
Dept: PHYSICAL THERAPY | Facility: CLINIC | Age: 54
End: 2019-04-03
Payer: COMMERCIAL

## 2020-01-21 DIAGNOSIS — Z23 NEED FOR INFLUENZA VACCINATION: ICD-10-CM

## 2020-01-21 DIAGNOSIS — Z12.4 CERVICAL CANCER SCREENING: ICD-10-CM

## 2020-01-21 DIAGNOSIS — Z23 NEED FOR TETANUS, DIPHTHERIA, AND ACELLULAR PERTUSSIS (TDAP) VACCINE: ICD-10-CM

## 2020-01-21 DIAGNOSIS — Z12.11 COLON CANCER SCREENING: Primary | ICD-10-CM

## 2020-01-22 ENCOUNTER — OFFICE VISIT (OUTPATIENT)
Dept: INTERNAL MEDICINE CLINIC | Facility: CLINIC | Age: 55
End: 2020-01-22
Payer: COMMERCIAL

## 2020-01-22 VITALS
SYSTOLIC BLOOD PRESSURE: 116 MMHG | DIASTOLIC BLOOD PRESSURE: 74 MMHG | OXYGEN SATURATION: 98 % | HEART RATE: 60 BPM | TEMPERATURE: 98.2 F | BODY MASS INDEX: 24.44 KG/M2 | WEIGHT: 133.6 LBS

## 2020-01-22 DIAGNOSIS — Z00.00 WELLNESS EXAMINATION: Primary | ICD-10-CM

## 2020-01-22 DIAGNOSIS — Z12.31 ENCOUNTER FOR SCREENING MAMMOGRAM FOR BREAST CANCER: ICD-10-CM

## 2020-01-22 DIAGNOSIS — Z12.4 PAP SMEAR FOR CERVICAL CANCER SCREENING: ICD-10-CM

## 2020-01-22 PROCEDURE — 99396 PREV VISIT EST AGE 40-64: CPT | Performed by: INTERNAL MEDICINE

## 2020-01-22 NOTE — PROGRESS NOTES
Assessment/Plan:  Consume less red meat to improve cholesterol  TSH was slightly high in 2018 but not performed in 2019  She has no symptoms related to this so will just wait for labs this year  Chest pain-tender to touch, likely muscular  She may take NSAIDs or Tylenol PRN her chest pain or low back pain  She will check her records for Adacel ot Td  Will request records from previous PCP     Problem List Items Addressed This Visit     None      Visit Diagnoses     Wellness examination    -  Primary    Encounter for screening mammogram for breast cancer        Relevant Orders    Mammo screening bilateral w cad    Pap smear for cervical cancer screening        Relevant Orders    Ambulatory referral to Obstetrics / Gynecology            Subjective:      Patient ID: Iliana Keller is a 49 y o  female  HPI  Here for a wellness  Up to date with metabolic screening which is performed at work yearly  Lipids slightly elevated  Due for mammo  Due for pap  She saw gyn in [place] a few years ago  She has had a colonoscopy also in [place]  Regular diet  Regular exercise, plays badminton regularly    The following portions of the patient's history were reviewed and updated as appropriate: allergies, current medications, past medical history, past social history, past surgical history and problem list     Review of Systems   Constitutional: Negative for fatigue, fever and unexpected weight change  HENT: Negative for congestion, sinus pressure, sinus pain and sore throat  Respiratory: Negative for cough, shortness of breath and wheezing  Cardiovascular: Positive for chest pain (left upper chest wall pain, tender to touch)  Negative for palpitations and leg swelling  Gastrointestinal: Negative for abdominal pain, constipation, diarrhea, nausea and vomiting  Genitourinary: Positive for pelvic pain (mild chroinc right sided)  Negative for difficulty urinating, dysuria and vaginal bleeding     Musculoskeletal: Positive for back pain (chronic left sided low back pain for which she has had PT)  Negative for arthralgias and myalgias  Neurological: Negative for dizziness and headaches  Psychiatric/Behavioral: Positive for sleep disturbance  Objective:      /74 (BP Location: Left arm, Patient Position: Sitting, Cuff Size: Standard)   Pulse 60   Temp 98 2 °F (36 8 °C)   Wt 60 6 kg (133 lb 9 6 oz)   SpO2 98%   BMI 24 44 kg/m²          Physical Exam   Constitutional: She is oriented to person, place, and time  She appears well-developed and well-nourished  HENT:   Head: Normocephalic and atraumatic  Right Ear: External ear normal    Left Ear: External ear normal    Mouth/Throat: Oropharynx is clear and moist    Eyes: Conjunctivae are normal    Neck: Neck supple  Cardiovascular: Normal rate, regular rhythm and normal heart sounds  No murmur heard  Pulmonary/Chest: Effort normal and breath sounds normal  No respiratory distress  She has no wheezes  She has no rales  She exhibits tenderness (left upper chest wall)  Abdominal: Soft  She exhibits no distension and no mass  There is no tenderness  There is no rebound and no guarding  Neurological: She is alert and oriented to person, place, and time  Skin: Skin is warm and dry  Psychiatric: She has a normal mood and affect   Her behavior is normal  Judgment and thought content normal

## 2020-06-12 ENCOUNTER — OFFICE VISIT (OUTPATIENT)
Dept: INTERNAL MEDICINE CLINIC | Facility: CLINIC | Age: 55
End: 2020-06-12
Payer: COMMERCIAL

## 2020-06-12 VITALS
HEIGHT: 60 IN | DIASTOLIC BLOOD PRESSURE: 68 MMHG | RESPIRATION RATE: 14 BRPM | SYSTOLIC BLOOD PRESSURE: 118 MMHG | BODY MASS INDEX: 27.21 KG/M2 | WEIGHT: 138.6 LBS | TEMPERATURE: 98 F | OXYGEN SATURATION: 96 % | HEART RATE: 67 BPM

## 2020-06-12 DIAGNOSIS — R21 RASH: ICD-10-CM
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
## 2020-06-12 PROCEDURE — 99213 OFFICE O/P EST LOW 20 MIN: CPT | Performed by: NURSE PRACTITIONER

## 2020-06-12 PROCEDURE — 1036F TOBACCO NON-USER: CPT | Performed by: NURSE PRACTITIONER

## 2020-06-12 PROCEDURE — 3008F BODY MASS INDEX DOCD: CPT | Performed by: NURSE PRACTITIONER

## 2020-06-12 RX ORDER — PREDNISONE 10 MG/1
TABLET ORAL
Qty: 30 TABLET | Refills: 0 | Status: SHIPPED | OUTPATIENT
Start: 2020-06-12 | End: 2020-06-24

## 2020-06-12 RX ORDER — CEPHALEXIN 500 MG/1
500 CAPSULE ORAL EVERY 6 HOURS SCHEDULED
Qty: 28 CAPSULE | Refills: 0 | Status: SHIPPED | OUTPATIENT
Start: 2020-06-12 | End: 2020-06-19

## 2020-07-10 ENCOUNTER — OFFICE VISIT (OUTPATIENT)
Dept: OBGYN CLINIC | Facility: CLINIC | Age: 55
End: 2020-07-10
Payer: COMMERCIAL

## 2020-07-10 VITALS
DIASTOLIC BLOOD PRESSURE: 64 MMHG | HEIGHT: 63 IN | BODY MASS INDEX: 23.74 KG/M2 | SYSTOLIC BLOOD PRESSURE: 110 MMHG | TEMPERATURE: 97.7 F | WEIGHT: 134 LBS

## 2020-07-10 DIAGNOSIS — Z12.4 PAP SMEAR FOR CERVICAL CANCER SCREENING: ICD-10-CM

## 2020-07-10 DIAGNOSIS — N95.0 POSTMENOPAUSAL BLEEDING: Primary | ICD-10-CM

## 2020-07-10 DIAGNOSIS — N84.1 CERVICAL POLYP: ICD-10-CM

## 2020-07-10 PROCEDURE — NC001 PR NO CHARGE: Performed by: OBSTETRICS & GYNECOLOGY

## 2020-07-10 PROCEDURE — 88305 TISSUE EXAM BY PATHOLOGIST: CPT | Performed by: PATHOLOGY

## 2020-07-10 PROCEDURE — 99203 OFFICE O/P NEW LOW 30 MIN: CPT | Performed by: OBSTETRICS & GYNECOLOGY

## 2020-07-10 PROCEDURE — 88175 CYTOPATH C/V AUTO FLUID REDO: CPT | Performed by: OBSTETRICS & GYNECOLOGY

## 2020-07-10 PROCEDURE — 3008F BODY MASS INDEX DOCD: CPT | Performed by: OBSTETRICS & GYNECOLOGY

## 2020-07-10 PROCEDURE — 87624 HPV HI-RISK TYP POOLED RSLT: CPT | Performed by: OBSTETRICS & GYNECOLOGY

## 2020-07-10 PROCEDURE — 58100 BIOPSY OF UTERUS LINING: CPT | Performed by: OBSTETRICS & GYNECOLOGY

## 2020-07-10 PROCEDURE — 1036F TOBACCO NON-USER: CPT | Performed by: OBSTETRICS & GYNECOLOGY

## 2020-07-10 NOTE — PROGRESS NOTES
Assessment/Plan    Diagnoses and all orders for this visit:    Postmenopausal bleeding  -     Tissue Exam - endometrial biopsy    -     Liquid-based pap, diagnostic    Cervical polyp  -     Tissue Exam          Subjective     Norma Dietz is a 48 y o  female here for a problem visit  Patient is complaining of postmenopausal bleeding  She went through menopause 3 years ago  A week ago she had bleeding that lasted 3 to 4 days  She has some right lower quadrant pain at the time as well  She states that ever since she got her  She always had more pain on the right side of her pelvis during menses  She is sexually active  Her last Pap was 3 years ago  She has a history of normal Paps  She did have polyps removed from her cervix in the past as well  She moved here from [place] 2 years ago  Patient Active Problem List   Diagnosis    Bug bite    Rash         Gynecologic History  No LMP recorded  Patient is postmenopausal   Contraception: post menopausal status  Last Pap: 3 years ago per patient     Obstetric History  OB History    Para Term  AB Living   2 2 2     2   SAB TAB Ectopic Multiple Live Births           2      # Outcome Date GA Lbr Omero/2nd Weight Sex Delivery Anes PTL Lv   2 Term 1997    Jose Gonzales   PAM   1 Term 1993    F [id number]-[id number]   PAM         History reviewed  No pertinent past medical history      Past Surgical History:   Procedure Laterality Date     SECTION      POLYPECTOMY      Sinus         Family History   Problem Relation Age of Onset    Hypertension Mother     No Known Problems Father        Social History     Socioeconomic History    Marital status: Unknown     Spouse name: Not on file    Number of children: Not on file    Years of education: Not on file    Highest education level: Not on file   Occupational History    Not on file   Social Needs    Financial resource strain: Not on file    Food insecurity:     Worry: Not on file     Inability: Not on file    Transportation needs:     Medical: Not on file     Non-medical: Not on file   Tobacco Use    Smoking status: Never Smoker    Smokeless tobacco: Never Used   Substance and Sexual Activity    Alcohol use: No    Drug use: No    Sexual activity: Yes     Partners: Male     Birth control/protection: Post-menopausal   Lifestyle    Physical activity:     Days per week: Not on file     Minutes per session: Not on file    Stress: Not on file   Relationships    Social connections:     Talks on phone: Not on file     Gets together: Not on file     Attends Congregation service: Not on file     Active member of club or organization: Not on file     Attends meetings of clubs or organizations: Not on file     Relationship status: Not on file    Intimate partner violence:     Fear of current or ex partner: Not on file     Emotionally abused: Not on file     Physically abused: Not on file     Forced sexual activity: Not on file   Other Topics Concern    Not on file   Social History Narrative    Not on file       Allergies  Sulfa antibiotics    Medications    Current Outpatient Medications:     calcium carbonate (OS-ARMANDO) 600 MG tablet, Take 600 mg by mouth 2 (two) times a day with meals, Disp: , Rfl:     diclofenac sodium (VOLTAREN) 1 %, APPLY 2 GRAMS TO AFFECTED AREA 4 TIMES A DAY, Disp: 100 g, Rfl: 0    Glucosamine-Chondroitin (MOVE FREE PO), Take by mouth, Disp: , Rfl:       Review of Systems  Review of Systems   Constitutional: Negative for chills, fever and unexpected weight change  Respiratory: Negative for cough and shortness of breath  Cardiovascular: Negative for chest pain and palpitations  Gastrointestinal: Negative for abdominal distention, abdominal pain, blood in stool, constipation, nausea and vomiting  Genitourinary: Positive for menstrual problem and vaginal bleeding   Negative for difficulty urinating, dyspareunia, dysuria, flank pain, genital sores, hematuria, pelvic pain, urgency, vaginal discharge and vaginal pain  Neurological: Negative for headaches  Objective     /64 (BP Location: Left arm, Patient Position: Sitting, Cuff Size: Standard)   Temp 97 7 °F (36 5 °C) (Temporal) Comment (Src): infrared  Ht 5' 3" (1 6 m)   Wt 60 8 kg (134 lb)   BMI 23 74 kg/m²         Physical Exam   Constitutional: She is oriented to person, place, and time  She appears well-developed and well-nourished  Genitourinary: Vagina normal and uterus normal  Pelvic exam was performed with patient supine  There is no rash, tenderness or lesion on the right labia  There is no rash, tenderness or lesion on the left labia  No vaginal discharge found  Right adnexum displays tenderness  Right adnexum does not display mass and does not display fullness  Left adnexum does not display mass, does not display tenderness and does not display fullness  Cervix exhibits polyp  Cervix does not exhibit motion tenderness or pinkness  Uterus is not enlarged, tender or irregular (is regular)  Genitourinary Comments: No bladder tenderness  Urethral meatus midline, no masses  Pulmonary/Chest: Effort normal  No respiratory distress  Neurological: She is alert and oriented to person, place, and time  Psychiatric: She has a normal mood and affect  Her behavior is normal    Vitals reviewed        Endometrial biopsy  Date/Time: 7/10/2020 12:25 PM  Performed by: Citlalli Conklin DO  Authorized by: Citlalli Conklin DO     Consent:     Consent obtained:  Written    Consent given by:  Patient    Procedural risks discussed:  Bleeding and infection    Patient questions answered: yes      Patient agrees, verbalizes understanding, and wants to proceed: yes    Indication:     Indications: Post-menopausal bleeding      Chronicity of post-menopausal bleeding:  New    Progression of post-menopausal bleeding:  Resolved  Pre-procedure:     Pre-procedure timeout performed: yes    Procedure:     Procedure: endometrial biopsy with Pipelle      A bivalve speculum was placed in the vagina: yes      Cervix cleaned and prepped: yes      The cervix was dilated: yes      Uterus sounded: yes      Uterus sound depth (cm):  8    Curettes used:  1    Specimen collected: specimen collected and sent to pathology      Patient tolerated procedure well with no complications: yes    Findings:     Uterus size:  <6 weeks    Cervix: normal      Adnexa: normal    Comments:      Cervical polyp grasped with ring forceps and twisted at the base and removed without difficulty    Minimal bleeding post removal

## 2020-07-15 LAB
HPV HR 12 DNA CVX QL NAA+PROBE: NEGATIVE
HPV16 DNA CVX QL NAA+PROBE: NEGATIVE
HPV18 DNA CVX QL NAA+PROBE: NEGATIVE

## 2020-07-15 NOTE — RESULT ENCOUNTER NOTE
Spoke with patient regarding results  Patient will monitor her bleeding  If any further episodes of vaginal bleeding, would recommend a D&C  At this time biopsy and polyp were both negative for malignancy  Pap smear still pending  All questions answered to apparent satisfaction  Patient will call with any vaginal bleeding

## 2020-07-17 LAB
LAB AP GYN PRIMARY INTERPRETATION: NORMAL
Lab: NORMAL

## 2020-11-19 ENCOUNTER — TELEPHONE (OUTPATIENT)
Dept: INTERNAL MEDICINE CLINIC | Facility: CLINIC | Age: 55
End: 2020-11-19

## 2020-11-19 DIAGNOSIS — Z20.822 EXPOSURE TO COVID-19 VIRUS: Primary | ICD-10-CM

## 2020-11-20 DIAGNOSIS — Z20.822 EXPOSURE TO COVID-19 VIRUS: ICD-10-CM

## 2020-11-20 PROCEDURE — U0003 INFECTIOUS AGENT DETECTION BY NUCLEIC ACID (DNA OR RNA); SEVERE ACUTE RESPIRATORY SYNDROME CORONAVIRUS 2 (SARS-COV-2) (CORONAVIRUS DISEASE [COVID-19]), AMPLIFIED PROBE TECHNIQUE, MAKING USE OF HIGH THROUGHPUT TECHNOLOGIES AS DESCRIBED BY CMS-2020-01-R: HCPCS | Performed by: INTERNAL MEDICINE

## 2020-11-21 LAB — SARS-COV-2 RNA SPEC QL NAA+PROBE: NOT DETECTED

## 2021-01-29 ENCOUNTER — OFFICE VISIT (OUTPATIENT)
Dept: INTERNAL MEDICINE CLINIC | Facility: CLINIC | Age: 56
End: 2021-01-29
Payer: COMMERCIAL

## 2021-01-29 VITALS
BODY MASS INDEX: 24.8 KG/M2 | RESPIRATION RATE: 14 BRPM | HEIGHT: 63 IN | OXYGEN SATURATION: 96 % | SYSTOLIC BLOOD PRESSURE: 98 MMHG | WEIGHT: 140 LBS | HEART RATE: 70 BPM | DIASTOLIC BLOOD PRESSURE: 64 MMHG

## 2021-01-29 DIAGNOSIS — Z12.11 SCREEN FOR COLON CANCER: ICD-10-CM

## 2021-01-29 DIAGNOSIS — N95.0 POST-MENOPAUSAL BLEEDING: ICD-10-CM

## 2021-01-29 DIAGNOSIS — Z00.00 LABORATORY EXAM ORDERED AS PART OF ROUTINE GENERAL MEDICAL EXAMINATION: ICD-10-CM

## 2021-01-29 DIAGNOSIS — Z00.00 WELLNESS EXAMINATION: Primary | ICD-10-CM

## 2021-01-29 DIAGNOSIS — L81.9 CHANGE IN PIGMENTED LESION OF FACE: ICD-10-CM

## 2021-01-29 DIAGNOSIS — Z12.31 BREAST CANCER SCREENING BY MAMMOGRAM: ICD-10-CM

## 2021-01-29 DIAGNOSIS — H91.93 BILATERAL HEARING LOSS, UNSPECIFIED HEARING LOSS TYPE: ICD-10-CM
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
## 2021-01-29 PROBLEM — R21 RASH: Status: RESOLVED | Noted: 2020-06-12 | Resolved: 2021-01-29

## 2021-01-29 PROCEDURE — 99396 PREV VISIT EST AGE 40-64: CPT | Performed by: INTERNAL MEDICINE

## 2021-01-29 PROCEDURE — 3008F BODY MASS INDEX DOCD: CPT | Performed by: INTERNAL MEDICINE

## 2021-01-29 PROCEDURE — 1036F TOBACCO NON-USER: CPT | Performed by: INTERNAL MEDICINE

## 2021-01-29 PROCEDURE — 3725F SCREEN DEPRESSION PERFORMED: CPT | Performed by: INTERNAL MEDICINE

## 2021-01-29 NOTE — PROGRESS NOTES
Assessment/Plan:  I will check with [facility] if she had labs in 2020 and if not, she will be called to get it done  I asked her to make an appt with Gyn re: vaginal bleeding  Plan in July was to do a D and C if this happened again  Vaccines discussed-she recalls getting the flu vaccine for this flu season  Shingrix, Tetanus vaccines discussed also     Problem List Items Addressed This Visit     None      Visit Diagnoses     Wellness examination    -  Primary    Laboratory exam ordered as part of routine general medical examination        Relevant Orders    CBC and differential    Comprehensive metabolic panel    HEMOGLOBIN A1C W/ EAG ESTIMATION    Lipid Panel with Direct LDL reflex    TSH, 3rd generation with Free T4 reflex    Post-menopausal bleeding        Relevant Orders    CBC and differential    Comprehensive metabolic panel    TSH, 3rd generation with Free T4 reflex    Ambulatory referral to Gynecology    Breast cancer screening by mammogram        Relevant Orders    Mammo screening bilateral w cad    Change in pigmented lesion of face        Relevant Orders    Ambulatory referral to Dermatology    Ambulatory referral to Dermatology    Bilateral hearing loss, unspecified hearing loss type        Relevant Orders    Ambulatory Referral to Otolaryngology    Screen for colon cancer        Relevant Orders    Cologuard            Subjective:      Patient ID: Helen Caruso is a 48 y o  female  HPI  Wellness  Not sure if she had metabolic screening for work in 2020  Overdue for mammogram  Pap in July when she saw Gyn for bleeding   Endometrial bx normal  She reports vaginal bleeding again over the past 2-3 days although very light  Not sure when she had her colonoscopy  Non smoker  No alcohol use  Regular healthy diet  Regular exercise  Working at [employer] and under stress at work    The following portions of the patient's history were reviewed and updated as appropriate: allergies, current medications, past family history, past medical history, past social history, past surgical history and problem list     Review of Systems   Constitutional: Negative for chills, fatigue, fever and unexpected weight change  HENT: Positive for hearing loss (chronic b/l) and tinnitus (chroinc mild right sided)  Negative for congestion and sore throat  Respiratory: Negative for cough and shortness of breath  Cardiovascular: Negative for chest pain, palpitations and leg swelling  Gastrointestinal: Negative for abdominal pain, constipation, diarrhea, nausea and vomiting  Genitourinary: Positive for pelvic pain (right side for a few days) and vaginal bleeding (for 2-3 days)  Negative for difficulty urinating  Musculoskeletal: Negative for arthralgias and myalgias  Skin:        Brown spot on the left temple she noticed 2months ago   Neurological: Negative for dizziness and headaches  Psychiatric/Behavioral: The patient is nervous/anxious  Objective:      BP 98/64   Pulse 70   Resp 14   Ht 5' 3" (1 6 m)   Wt 63 5 kg (140 lb)   SpO2 96%   BMI 24 80 kg/m²          Physical Exam  Constitutional:       General: She is not in acute distress  Appearance: She is well-developed  She is not toxic-appearing or diaphoretic  HENT:      Head: Normocephalic and atraumatic  Right Ear: External ear normal  There is no impacted cerumen  Left Ear: External ear normal  There is no impacted cerumen  Eyes:      Conjunctiva/sclera: Conjunctivae normal    Neck:      Musculoskeletal: Neck supple  Cardiovascular:      Rate and Rhythm: Normal rate and regular rhythm  Heart sounds: Normal heart sounds  No murmur  Pulmonary:      Effort: Pulmonary effort is normal  No respiratory distress  Breath sounds: Normal breath sounds  No wheezing or rales  Abdominal:      General: There is no distension  Palpations: Abdomen is soft  There is no mass  Tenderness: There is no abdominal tenderness   There is no guarding or rebound  Musculoskeletal:      Right lower leg: No edema  Skin:     General: Skin is warm and dry  Findings: Lesion (slightly raised tan oval lesion on the left temple about 1cm in length) present  Neurological:      Mental Status: She is alert and oriented to person, place, and time  Psychiatric:         Mood and Affect: Mood normal          Behavior: Behavior normal          Thought Content:  Thought content normal          Judgment: Judgment normal

## 2021-03-11 ENCOUNTER — OFFICE VISIT (OUTPATIENT)
Dept: DERMATOLOGY | Facility: CLINIC | Age: 56
End: 2021-03-11
Payer: COMMERCIAL

## 2021-03-11 VITALS — BODY MASS INDEX: 25.3 KG/M2 | HEIGHT: 63 IN | WEIGHT: 142.8 LBS | TEMPERATURE: 97.6 F

## 2021-03-11 DIAGNOSIS — D22.9 MULTIPLE NEVI: Primary | ICD-10-CM

## 2021-03-11 DIAGNOSIS — L81.4 LENTIGO: ICD-10-CM

## 2021-03-11 PROCEDURE — 99203 OFFICE O/P NEW LOW 30 MIN: CPT | Performed by: DERMATOLOGY

## 2021-03-11 NOTE — PATIENT INSTRUCTIONS
MULTIPLE MELANOCYTIC NEVI ("Moles")        Assessment and Plan:  Based on a thorough discussion of this condition and the management approach to it (including a comprehensive discussion of the known risks, side effects and potential benefits of treatment), the patient (family) agrees to implement the following specific plan:  · Reassure benign  · Monitor for changes  · Use sun protection  Apply SPF 30 or higher at least three times a day  Wear sun protecting clothing and hats  Worrisome signs of skin malignancy discussed, questions answered  Regular self-skin check discussed  Advised to call or return to office if patient notices any spots of concern, rapidly growing/changing lesions, bleeding lesions, non-healing lesions  Advised regular SPF use  LENTIGO      Assessment and Plan:  Based on a thorough discussion of this condition and the management approach to it (including a comprehensive discussion of the known risks, side effects and potential benefits of treatment), the patient (family) agrees to implement the following specific plan:  · Reassure benign  · Use sun protection  Apply SPF 30 or higher at least three times a day  Wear sun protecting clothing and hats  What is a lentigo? A lentigo is a pigmented flat or slightly raised lesion with a clearly defined edge  Unlike an ephelis (freckle), it does not fade in the winter months  There are several kinds of lentigo  The name lentigo originally referred to its appearance resembling a small lentil  The plural of lentigo is lentigines, although lentigos is also in common use  Who gets lentigines? Lentigines can affect males and females of all ages and races  Solar lentigines are especially prevalent in fair skinned adults  Lentigines associated with syndromes are present at birth or arise during childhood  What causes lentigines?   Common forms of lentigo are due to exposure to ultraviolet radiation:   Sun damage including sunburn  Indoor tanning    Phototherapy, especially photochemotherapy (PUVA)    Ionizing radiation, eg radiation therapy, can also cause lentigines  Several familial syndromes associated with widespread lentigines originate from mutations in Aristides-MAP kinase, mTOR signaling and PTEN pathways  What are the clinical features of lentigines? Lentigines have been classified into several different types depending on what they look like, where they appear on the body, causative factors, and whether they are associated to other diseases or conditions  Lentigines may be solitary or more often, multiple  Most lentigines are smaller than 5 mm in diameter      Lentigo simplex   A precursor to junctional naevus    Arises during childhood and early adult life    Found on trunk and limbs    Small brown round or oval macule or thin plaque    Jagged or smooth edge    May have a dry surface    May disappear in time  Solar lentigo   A precursor to seborrhoeic keratosis    Found on chronically sun exposed sites such as hands, face, lower legs    May also follow sunburn to shoulders    Yellow, light or dark brown regular or irregular macule or thin plaque    May have a dry surface    Often has moth-eaten outline    Can slowly enlarge to several centimeters in diameter    May disappear, often through the process known as lichenoid keratosis    When atypical in appearance, may be difficult to distinguish from melanoma in situ  Ink spot lentigo   Also known as reticulated lentigo    Few in number compared to solar lentigines    Follows sunburn in very fair skinned individuals    Dark brown to black irregular ink spot-like macule  PUVA lentigo   Similar to ink spot lentigo but follows photochemotherapy (PUVA)    Location anywhere exposed to PUVA  Tanning bed lentigo   Similar to ink spot lentigo but follows indoor tanning    Location anywhere exposed to tanning bed  Radiation lentigo   Occurs in site of irradiation (accidental or therapeutic)    Associated with late-stage radiation dermatitis: epidermal atrophy, subcutaneous fibrosis, keratosis, telangiectasias  Melanotic macule   Mucosal surfaces or adjacent glabrous skin eg lip, vulva, penis, anus    Light to dark brown    Also called mucosal melanosis  Generalised lentigines   Found on any exposed or covered site from early childhood    Small macules may merge to form larger patches    Not associated with a syndrome    Also called lentigines profusa, multiple lentigines  Agminated lentigines   Naevoid eruption of lentigos confined to a single segmental area    Sharp demarcation in midline    May have associated neurological and developmental abnormalities  Patterned lentigines   Inherited tendency to lentigines on face, lips, buttocks, palms, soles    Recognised mainly in people of  ethnicity  Centrofacial neurodysraphic lentiginosis  Associated with mental retardation  Lentiginosis syndromes   Syndromes include LEOPARD/Nixon, Peutz-Jeghers, Laugier-Hunziker, Moynahan, Xeroderma pigmentosum, myxoma syndromes (MILIAN, NAME, Guerrero), Ruvalcaba-Myhre-Contreras, Bannayan-Zonnana syndrome, Cowden disease (multiple hamartoma syndrome )    Inheritance is autosomal dominant; sporadic cases common    Widespread lentigines present at birth or arise in early childhood    Associated with neural, endocrine, and mesenchymal tumors    How is the diagnosis made? Lentigines are usually diagnosed clinically by their typical appearance   Concern regarding possibility of melanoma may lead to:   Dermatoscopy    Diagnostic excision biopsy    Histopathology of a lentigo shows:   Thickened epidermis    An increased number of melanocytes along the basal layer of epidermis    Unlike junctional melanocytic naevus, there are no nests of melanocytes    Increased melanin pigment within the keratinocytes    Additional features depending on type of lentigo    In contrast, an ephelis (freckle) shows sun-induced increased melanin within the keratinocytes, without an increase in number of cells  What is the treatment for lentigines? Most lentigines are left alone  Attempts to lighten them may not be successful  The following approaches are used:   SPF 50+ broad-spectrum sunscreen    Hydroquinone bleaching cream    Alpha hydroxy acids    Vitamin C    Retinoids    Azelaic acid    Chemical peels  Individual lesions can be permanently removed using:   Cryotherapy    Intense pulsed light    Pigment lasers    How can lentigines be prevented? Lentigines associated with exposure ultraviolet radiation can be prevented by very careful sun protection  Clothing is more successful at preventing new lentigines than are sunscreens  What is the outlook for lentigines? Lentigines usually persist  They may increase in number with age and sun exposure  Some in sun-protected sites may fade and disappear

## 2021-03-11 NOTE — PROGRESS NOTES
[address] [facility] Note     Patient Name: Amy Branch  Encounter Date: 3/11/21     Have you been cared for by a [facility] Dermatologist in the last 3 years and, if so, which one? No    · Have you traveled outside of the [address] [place] [address] in the past 3 months or outside of the [facility] area in the last 2 weeks? No     May we call your Preferred Phone number to discuss your specific medical information? Yes     May we leave a detailed message that includes your specific medical information? Yes      Today's Chief Concerns:   Concern #1:  Spot of concern on left temple   Concern #2:      Past Medical History:  Have you personally ever had or currently have any of the following? · Skin cancer (such as Melanoma, Basal Cell Carcinoma, Squamous Cell Carcinoma? (If Yes, please provide more detail)- No  · Eczema: No  · Psoriasis: No  · HIV/AIDS: No  · Hepatitis B or C: No  · Tuberculosis: No  · Systemic Immunosuppression such as Diabetes, Biologic or Immunotherapy, Chemotherapy, Organ Transplantation, Bone Marrow Transplantation (If YES, please provide more detail): No  · Radiation Treatment (If YES, please provide more detail): No  · Any other major medical conditions/concerns? (If Yes, which types)- No    Social History:     What is/was your primary occupation? Teacher     What are your hobbies/past-times? Shari     Family History:  Have any of your "first degree relatives" (parent, brother, sister, or child) had any of the following       · Skin cancer such as Melanoma or Merkel Cell Carcinoma or Pancreatic Cancer? No  · Eczema, Asthma, Hay Fever or Seasonal Allergies: No  · Psoriasis or Psoriatic Arthritis: No  · Do any other medical conditions seem to run in your family? If Yes, what condition and which relatives? No    Current Medications:       No current outpatient medications on file        Review of Systems:  Have you recently had or currently have any of the following? If YES, what are you doing for the problem? · Fever, chills or unintended weight loss: No  · Sudden loss or change in your vision: No  · Nausea, vomiting or blood in your stool: No  · Painful or swollen joints: YES, in hips; goes to physical therapist  · Wheezing or cough: No  · Changing mole or non-healing wound: No  · Nosebleeds: No  · Excessive sweating: No  · Easy or prolonged bleeding? No  · Over the last 2 weeks, how often have you been bothered by the following problems? · Taking little interest or pleasure in doing things: 1 - Not at All  · Feeling down, depressed, or hopeless: 1 - Not at All  · Rapid heartbeat with epinephrine:  No    · FEMALES ONLY:    · Are you pregnant or planning to become pregnant? No  · Are you currently or planning to be nursing or breast feeding? No    · Any known allergies? Allergies   Allergen Reactions    Sulfa Antibiotics Rash         Physical Exam:     Was a chaperone (Derm Clinical Assistant) present throughout the entire Physical Exam? Yes    o Did the Dermatology Team specifically  the patient on the importance of a Full Skin Exam to be sure that nothing is missed clinically?  NO - spot of concern clinic  o Did the patient ultimately request or accept a Full Skin Exam?  NO - spot of concern clinic  o Did the patient specifically refuse to have the areas "under-the-bra" examined by the Dermatologist? No  o Did the patient specifically refuse to have the areas "under-the-underwear" examined by the Dermatologist? No    CONSTITUTIONAL:   Vitals:    03/11/21 1058   Temp: 97 6 °F (36 4 °C)   TempSrc: Tympanic   Weight: 64 8 kg (142 lb 12 8 oz)   Height: 5' 2 5" (1 588 m)           PSYCH: Normal mood and affect  EYES: Normal conjunctiva  ENT: Normal lips and oral mucosa  CARDIOVASCULAR: No edema  RESPIRATORY: Normal respirations  HEME/LYMPH/IMMUNO:  No regional lymphadenopathy except as noted below in "ASSESSMENT AND PLAN BY DIAGNOSIS"    SKIN: FULL ORGAN SYSTEM EXAM   Face Normal except as noted below in Assessment                               Right Leg Normal except as noted below in Assessment     Assessment and Plan by Diagnosis:    History of Present Condition:     Duration:  How long has this been an issue for you?    o  One month ago   Location Affected:  Where on the body is this affecting you? o  Left temple   Quality:  Is there any bleeding, pain, itch, burning/irritation, or redness associated with the skin lesion?    o  Itching   Severity:  Describe any bleeding, pain, itch, burning/irritation, or redness on a scale of 1 to 10 (with 10 being the worst)  o  2   Timing:  Does this condition seem to be there pretty constantly or do you notice it more at specific times throughout the day?    o  Constantly   Context:  Have you ever noticed that this condition seems to be associated with specific activities you do?    o  Denies   Modifying Factors:    o Anything that seems to make the condition worse?    -  Denies  o What have you tried to do to make the condition better? -  Nothing   Associated Signs and Symptoms:  Does this skin lesion seem to be associated with any of the following:  o  SL AMB DERM SIGNS AND SYMPTOMS: Itching and Scratching          MULTIPLE MELANOCYTIC NEVI ("Moles")     Physical Exam:  · Anatomic Location Affected: Face and right lateral leg  · Morphological Description:  Scattered, round to ovoid, symmetrical-appearing, even bordered, skin colored to dark brown macules/papules  · Denies pain, itch, bleeding  No treatments tried  Present for years  Present constantly; no modifying factors which make it worse or better  Denies actively changing or growing moles        Assessment and Plan:  Based on a thorough discussion of this condition and the management approach to it (including a comprehensive discussion of the known risks, side effects and potential benefits of treatment), the patient (family) agrees to implement the following specific plan:  · Reassure benign  · Monitor for changes  · Use sun protection  Apply SPF 30 or higher at least three times a day  Wear sun protecting clothing and hats  Worrisome signs of skin malignancy discussed, questions answered  Regular self-skin check discussed  Advised to call or return to office if patient notices any spots of concern, rapidly growing/changing lesions, bleeding lesions, non-healing lesions  Advised regular SPF use  LENTIGO    Physical Exam:   Anatomic Location Affected:  Left Jehovah's witness   Morphological Description:  Multiple scattered brown to tan evenly pigmented macules    Pertinent Positives:   Pertinent Negatives: Additional History of Present Condition:  Patient complains of itchiness  No treatments tried  Present for months  Reports getting newer lesions with sun exposure  Assessment and Plan:  Based on a thorough discussion of this condition and the management approach to it (including a comprehensive discussion of the known risks, side effects and potential benefits of treatment), the patient (family) agrees to implement the following specific plan:  · Reassure benign  · Use sun protection  Apply SPF 30 or higher at least three times a day  Wear sun protecting clothing and hats  What is a lentigo? A lentigo is a pigmented flat or slightly raised lesion with a clearly defined edge  Unlike an ephelis (freckle), it does not fade in the winter months  There are several kinds of lentigo  The name lentigo originally referred to its appearance resembling a small lentil  The plural of lentigo is lentigines, although lentigos is also in common use  Who gets lentigines? Lentigines can affect males and females of all ages and races  Solar lentigines are especially prevalent in fair skinned adults  Lentigines associated with syndromes are present at birth or arise during childhood  What causes lentigines?   Common forms of lentigo are due to exposure to ultraviolet radiation:   Sun damage including sunburn    Indoor tanning    Phototherapy, especially photochemotherapy (PUVA)    Ionizing radiation, eg radiation therapy, can also cause lentigines  Several familial syndromes associated with widespread lentigines originate from mutations in Aristides-MAP kinase, mTOR signaling and PTEN pathways  What are the clinical features of lentigines? Lentigines have been classified into several different types depending on what they look like, where they appear on the body, causative factors, and whether they are associated to other diseases or conditions  Lentigines may be solitary or more often, multiple  Most lentigines are smaller than 5 mm in diameter      Lentigo simplex   A precursor to junctional naevus    Arises during childhood and early adult life    Found on trunk and limbs    Small brown round or oval macule or thin plaque    Jagged or smooth edge    May have a dry surface    May disappear in time  Solar lentigo   A precursor to seborrhoeic keratosis    Found on chronically sun exposed sites such as hands, face, lower legs    May also follow sunburn to shoulders    Yellow, light or dark brown regular or irregular macule or thin plaque    May have a dry surface    Often has moth-eaten outline    Can slowly enlarge to several centimeters in diameter    May disappear, often through the process known as lichenoid keratosis    When atypical in appearance, may be difficult to distinguish from melanoma in situ  Ink spot lentigo   Also known as reticulated lentigo    Few in number compared to solar lentigines    Follows sunburn in very fair skinned individuals    Dark brown to black irregular ink spot-like macule  PUVA lentigo   Similar to ink spot lentigo but follows photochemotherapy (PUVA)    Location anywhere exposed to PUVA  Tanning bed lentigo   Similar to ink spot lentigo but follows indoor tanning    Location anywhere exposed to tanning bed  Radiation lentigo   Occurs in site of irradiation (accidental or therapeutic)    Associated with late-stage radiation dermatitis: epidermal atrophy, subcutaneous fibrosis, keratosis, telangiectasias  Melanotic macule   Mucosal surfaces or adjacent glabrous skin eg lip, vulva, penis, anus    Light to dark brown    Also called mucosal melanosis  Generalised lentigines   Found on any exposed or covered site from early childhood    Small macules may merge to form larger patches    Not associated with a syndrome    Also called lentigines profusa, multiple lentigines  Agminated lentigines   Naevoid eruption of lentigos confined to a single segmental area    Sharp demarcation in midline    May have associated neurological and developmental abnormalities  Patterned lentigines   Inherited tendency to lentigines on face, lips, buttocks, palms, soles    Recognised mainly in people of  ethnicity  Centrofacial neurodysraphic lentiginosis  Associated with mental retardation  Lentiginosis syndromes   Syndromes include LEOPARD/Parshall, Peutz-Jeghers, Laugier-Hunziker, Moynahan, Xeroderma pigmentosum, myxoma syndromes (MILIAN, NAME, Guerrero), Ruvalcaba-Myhre-Contreras, Bannayan-Zonnana syndrome, Cowden disease (multiple hamartoma syndrome )    Inheritance is autosomal dominant; sporadic cases common    Widespread lentigines present at birth or arise in early childhood    Associated with neural, endocrine, and mesenchymal tumors    How is the diagnosis made? Lentigines are usually diagnosed clinically by their typical appearance   Concern regarding possibility of melanoma may lead to:   Dermatoscopy    Diagnostic excision biopsy    Histopathology of a lentigo shows:   Thickened epidermis    An increased number of melanocytes along the basal layer of epidermis    Unlike junctional melanocytic naevus, there are no nests of melanocytes    Increased melanin pigment within the keratinocytes    Additional features depending on type of lentigo    In contrast, an ephelis (freckle) shows sun-induced increased melanin within the keratinocytes, without an increase in number of cells  What is the treatment for lentigines? Most lentigines are left alone  Attempts to lighten them may not be successful  The following approaches are used:   SPF 50+ broad-spectrum sunscreen    Hydroquinone bleaching cream    Alpha hydroxy acids    Vitamin C    Retinoids    Azelaic acid    Chemical peels  Individual lesions can be permanently removed using:   Cryotherapy    Intense pulsed light    Pigment lasers    How can lentigines be prevented? Lentigines associated with exposure ultraviolet radiation can be prevented by very careful sun protection  Clothing is more successful at preventing new lentigines than are sunscreens  What is the outlook for lentigines? Lentigines usually persist  They may increase in number with age and sun exposure  Some in sun-protected sites may fade and disappear  Lentigos and nevi on today's exam   Discussed benign quality and solar etiology for the lentigos  Discussed need for sun avoidance measures and to return for new or changing lesions      Scribe Attestation    I,:  Neli Foy RN am acting as a scribe while in the presence of the attending physician :       I,:  Travis Feliciano MD personally performed the services described in this documentation    as scribed in my presence :

## 2021-03-13 ENCOUNTER — HOSPITAL ENCOUNTER (OUTPATIENT)
Dept: MAMMOGRAPHY | Facility: HOSPITAL | Age: 56
Discharge: HOME | End: 2021-03-13
Payer: COMMERCIAL

## 2021-03-13 VITALS — WEIGHT: 140 LBS | BODY MASS INDEX: 24.8 KG/M2 | HEIGHT: 63 IN

## 2021-03-13 DIAGNOSIS — Z12.31 BREAST CANCER SCREENING BY MAMMOGRAM: ICD-10-CM

## 2021-03-13 DIAGNOSIS — Z12.31 VISIT FOR SCREENING MAMMOGRAM: ICD-10-CM

## 2021-03-13 PROCEDURE — 77063 BREAST TOMOSYNTHESIS BI: CPT

## 2021-03-13 PROCEDURE — 77067 SCR MAMMO BI INCL CAD: CPT

## 2021-03-16 DIAGNOSIS — R19.5 POSITIVE COLORECTAL CANCER SCREENING USING COLOGUARD TEST: Primary | ICD-10-CM

## 2021-04-09 DIAGNOSIS — Z23 ENCOUNTER FOR IMMUNIZATION: ICD-10-CM

## 2021-04-20 ENCOUNTER — IMMUNIZATIONS (OUTPATIENT)
Dept: FAMILY MEDICINE CLINIC | Facility: HOSPITAL | Age: 56
End: 2021-04-20

## 2021-04-20 DIAGNOSIS — Z23 ENCOUNTER FOR IMMUNIZATION: Primary | ICD-10-CM

## 2021-04-20 PROCEDURE — 91300 SARS-COV-2 / COVID-19 MRNA VACCINE (PFIZER-BIONTECH) 30 MCG: CPT

## 2021-04-20 PROCEDURE — 0001A SARS-COV-2 / COVID-19 MRNA VACCINE (PFIZER-BIONTECH) 30 MCG: CPT

## 2021-04-28 ENCOUNTER — VBI (OUTPATIENT)
Dept: ADMINISTRATIVE | Facility: OTHER | Age: 56
End: 2021-04-28

## 2021-05-03 ENCOUNTER — OFFICE VISIT (OUTPATIENT)
Dept: OBGYN CLINIC | Facility: CLINIC | Age: 56
End: 2021-05-03
Payer: COMMERCIAL

## 2021-05-03 VITALS
BODY MASS INDEX: 25.09 KG/M2 | HEIGHT: 63 IN | SYSTOLIC BLOOD PRESSURE: 116 MMHG | WEIGHT: 141.6 LBS | DIASTOLIC BLOOD PRESSURE: 62 MMHG

## 2021-05-03 DIAGNOSIS — N95.0 POST-MENOPAUSAL BLEEDING: Primary | ICD-10-CM

## 2021-05-03 DIAGNOSIS — R10.2 PELVIC PAIN: ICD-10-CM

## 2021-05-03 PROCEDURE — 99214 OFFICE O/P EST MOD 30 MIN: CPT | Performed by: PHYSICIAN ASSISTANT

## 2021-05-03 PROCEDURE — 3008F BODY MASS INDEX DOCD: CPT | Performed by: PHYSICIAN ASSISTANT

## 2021-05-03 PROCEDURE — 1036F TOBACCO NON-USER: CPT | Performed by: PHYSICIAN ASSISTANT

## 2021-05-03 NOTE — PROGRESS NOTES
Assessment/Plan:  - Reviewed prior episodes of PMB  - Previous normal benign EMB and cervical polyp  - Was recommended at that time if bleeding continues, patient should have D&C  - Pelvic US ordered  - Patient to RTO for surgical consult       Diagnoses and all orders for this visit:    Post-menopausal bleeding  -     Ambulatory referral to Gynecology  -     US pelvis complete w transvaginal; Future    Pelvic pain  -     Ambulatory referral to Gynecology  -     US pelvis complete w transvaginal; Future          Subjective:      Patient ID: Anusha Lopez is a 48 y o  female  Karrie Martinez is a 50YO  female presenting to the office with c/o persistent PMB  Patient reports she is not currently bleeding at this time  The last episode of PMB she had was in 2021 which resolved on its own  She was unable to come in to the office to be seen when the bleeding was active  Patient reports she had moderately heavy bleeding for 2 days and needed to use maxi pads  She reports this bleeding was unprovoked, and not incited by intercourse  Patient describes the bleeding as bright red  She has had 3 episodes of PMB since 2020  In 2020, she had her second reported episode  At that time, she had a cervical polyp removed and an EMB performed  Both results were benign  It was advised to patient that if bleeding continued, she should have a D&C  Patient also reports intermittent random right sided lower abdominal/pelvic pain  She reports this pain has been occurring since she was 16years old when her menses first started  She reports this pain has occurred with her episodes of PMB, but is improving as she gets older  Patient denies fevers, chills, unintentional weight loss               The following portions of the patient's history were reviewed and updated as appropriate: allergies, current medications, past family history, past medical history, past social history, past surgical history and problem list     Review of Systems   Constitutional: Negative for chills, fever and unexpected weight change  Respiratory: Negative for shortness of breath  Cardiovascular: Negative for chest pain  Gastrointestinal: Negative for abdominal pain, diarrhea, nausea and vomiting  Genitourinary: Positive for menstrual problem, pelvic pain (right sided lower cramping) and vaginal discharge  Skin: Negative for rash  Objective:      /62   Ht 5' 3" (1 6 m)   Wt 64 2 kg (141 lb 9 6 oz)   BMI 25 08 kg/m²          Physical Exam  Vitals signs reviewed  Constitutional:       Appearance: Normal appearance  HENT:      Head: Normocephalic and atraumatic  Pulmonary:      Effort: Pulmonary effort is normal    Abdominal:      General: There is no distension  Palpations: Abdomen is soft  Tenderness: There is abdominal tenderness (minimally TTP with deep palpation of RLQ) in the right lower quadrant  There is no guarding or rebound  Genitourinary:     General: Normal vulva  Exam position: Lithotomy position  Pubic Area: No rash  Labia:         Right: No rash or lesion  Left: No rash or lesion  Vagina: Normal  No vaginal discharge, tenderness or lesions  Cervix: No discharge or lesion  Uterus: Normal  Not tender  Adnexa: Left adnexa normal         Right: No mass or tenderness  Left: No mass or tenderness  Comments: No cervical polyps present on exam    Skin:     General: Skin is warm and dry  Findings: No lesion or rash  Neurological:      General: No focal deficit present  Mental Status: She is alert     Psychiatric:         Mood and Affect: Mood normal          Behavior: Behavior normal

## 2021-05-13 ENCOUNTER — IMMUNIZATIONS (OUTPATIENT)
Dept: FAMILY MEDICINE CLINIC | Facility: HOSPITAL | Age: 56
End: 2021-05-13
Payer: COMMERCIAL

## 2021-05-13 ENCOUNTER — HOSPITAL ENCOUNTER (OUTPATIENT)
Dept: ULTRASOUND IMAGING | Facility: HOSPITAL | Age: 56
Discharge: HOME | End: 2021-05-13
Payer: COMMERCIAL

## 2021-05-13 DIAGNOSIS — R10.2 PELVIC PAIN: ICD-10-CM

## 2021-05-13 DIAGNOSIS — Z23 ENCOUNTER FOR IMMUNIZATION: Primary | ICD-10-CM

## 2021-05-13 DIAGNOSIS — N95.0 POST-MENOPAUSAL BLEEDING: ICD-10-CM

## 2021-05-13 PROCEDURE — 76856 US EXAM PELVIC COMPLETE: CPT

## 2021-05-13 PROCEDURE — 91300 SARS-COV-2 / COVID-19 MRNA VACCINE (PFIZER-BIONTECH) 30 MCG: CPT

## 2021-05-13 PROCEDURE — 0002A SARS-COV-2 / COVID-19 MRNA VACCINE (PFIZER-BIONTECH) 30 MCG: CPT

## 2021-05-13 PROCEDURE — 76830 TRANSVAGINAL US NON-OB: CPT

## 2021-06-02 ENCOUNTER — OFFICE VISIT (OUTPATIENT)
Dept: OBGYN CLINIC | Facility: CLINIC | Age: 56
End: 2021-06-02
Payer: COMMERCIAL

## 2021-06-02 VITALS — DIASTOLIC BLOOD PRESSURE: 66 MMHG | BODY MASS INDEX: 25.15 KG/M2 | WEIGHT: 142 LBS | SYSTOLIC BLOOD PRESSURE: 108 MMHG

## 2021-06-02 DIAGNOSIS — N95.0 POSTMENOPAUSAL BLEEDING: Primary | ICD-10-CM

## 2021-06-02 PROCEDURE — 99214 OFFICE O/P EST MOD 30 MIN: CPT | Performed by: OBSTETRICS & GYNECOLOGY

## 2021-06-02 PROCEDURE — 1036F TOBACCO NON-USER: CPT | Performed by: OBSTETRICS & GYNECOLOGY

## 2021-06-05 PROBLEM — N95.0 POSTMENOPAUSAL BLEEDING: Status: ACTIVE | Noted: 2021-06-05

## 2021-06-05 NOTE — H&P (VIEW-ONLY)
Assessment/Plan:   Diagnoses and all orders for this visit:    Postmenopausal bleeding    - reviewed with patient results of US including EMS of 5mm and presence of submucosal leiomyoma  Recommendation for hysteroscopy with D&C discussed for tissue sampling, evaluation of submucosal leiomyoma, and possible removal of leiomyoma  - Reviewed details of the procedure and patient was counseled on risks related to surgical procedure including uterine perforation, inability to complete procedure as planned and need for repeat procedure, fluid overload/electrolyte imbalance, infection, and vaginal bleeding after procedure  Consent was obtained  - surgical scheduling occurred prior to patient's departure and patient scheduled for 21  Surgical booklet and hibiclens wash provided to patient  Subjective:    Patient ID: Dorien Felty is a 55 y o  female  Chief Complaint   Patient presents with    Consult     Pt is here to discuss US results and discuss possible surgery  Jed Don is a 52yo  presenting today for surgical consultation regarding her persisitent PMB  She has had recurrent episodes of postmenopausal bleeding since 2020  She was seen for evaluation and was noted to have benign EMB and a cervical polyp was removed that was also benign  Patient was counseled that if PMB continued, recommendation would be for hysteroscopy and D&C for further evaluation  Patient had recent US, results that are as follows:  US pelvis complete w transvaginal    Narrative: PELVIC ULTRASOUND, COMPLETE    INDICATION:  55years old  N95 0: Postmenopausal bleeding  R10 2: Pelvic and perineal pain  COMPARISON: None    TECHNIQUE:   Transabdominal pelvic ultrasound was performed in sagittal and transverse planes with a curvilinear transducer  Additional transvaginal imaging was performed to better evaluate the endometrium and ovaries    Imaging included volumetric   sweeps as well as traditional still imaging technique  FINDINGS:    UTERUS:  The uterus is anteverted in position, measuring 8 2 x 5 1 x 3 9 cm  Mildly heterogeneous myometrial echotexture  There is a submucosal leiomyoma measuring 1 9 x 1 2 x 2 cm in the posterior aspect of uterine fundus/body  The cervix shows no suspicious abnormality  ENDOMETRIUM:    Normal caliber of 5 mm  Homogeneous and normal in appearance  OVARIES/ADNEXA:  Right ovary: The right ovary is not visualized on transabdominal or transvaginal sonographic examination  No suspicious right adnexal mass  Left ovary:  1 5 x 1 0x 0 9 cm  No suspicious left ovarian abnormality  Doppler flow within normal limits  No suspicious adnexal mass or loculated collections  There is no free fluid  Impression:    1  Mildly heterogeneous uterus with a 1 9 cm submucosal leiomyoma  2   Normal thickness endometrium measuring 5 mm  3   Nonvisualization of right ovary  No suspicious adnexal mass  The following portions of the patient's history were reviewed and updated as appropriate: allergies, current medications, past family history, past medical history, past social history, past surgical history and problem list     Review of Systems   Constitutional: Negative for activity change, appetite change and unexpected weight change  Respiratory: Negative for cough and shortness of breath  Cardiovascular: Negative for chest pain  Gastrointestinal: Negative for abdominal pain, constipation, diarrhea, nausea and vomiting  Genitourinary: Positive for menstrual problem and vaginal bleeding  Negative for difficulty urinating, dyspareunia, frequency and pelvic pain  Musculoskeletal: Negative for back pain  Neurological: Negative for dizziness, weakness, light-headedness and headaches  Psychiatric/Behavioral: Negative            Objective:  /66 (BP Location: Right arm, Patient Position: Sitting, Cuff Size: Standard)   Wt 64 4 kg (142 lb)   BMI 25 15 kg/m² Physical Exam  Constitutional:       General: She is not in acute distress  Appearance: Normal appearance  She is normal weight  She is not diaphoretic  HENT:      Head: Normocephalic and atraumatic  Cardiovascular:      Rate and Rhythm: Normal rate and regular rhythm  Heart sounds: Normal heart sounds  No murmur  No gallop  Pulmonary:      Effort: Pulmonary effort is normal  No respiratory distress  Breath sounds: Normal breath sounds  No wheezing or rhonchi  Musculoskeletal:         General: No tenderness  Right lower leg: No edema  Left lower leg: No edema  Neurological:      Mental Status: She is alert and oriented to person, place, and time  Psychiatric:         Mood and Affect: Mood normal          Behavior: Behavior normal          Thought Content: Thought content normal          Judgment: Judgment normal    Vitals signs and nursing note reviewed

## 2021-06-05 NOTE — PROGRESS NOTES
Assessment/Plan:   Diagnoses and all orders for this visit:    Postmenopausal bleeding    - reviewed with patient results of US including EMS of 5mm and presence of submucosal leiomyoma  Recommendation for hysteroscopy with D&C discussed for tissue sampling, evaluation of submucosal leiomyoma, and possible removal of leiomyoma  - Reviewed details of the procedure and patient was counseled on risks related to surgical procedure including uterine perforation, inability to complete procedure as planned and need for repeat procedure, fluid overload/electrolyte imbalance, infection, and vaginal bleeding after procedure  Consent was obtained  - surgical scheduling occurred prior to patient's departure and patient scheduled for 21  Surgical booklet and hibiclens wash provided to patient  Subjective:    Patient ID: Bianka Luis is a 55 y o  female  Chief Complaint   Patient presents with    Consult     Pt is here to discuss US results and discuss possible surgery  [facility] is a 50yo  presenting today for surgical consultation regarding her persisitent PMB  She has had recurrent episodes of postmenopausal bleeding since 2020  She was seen for evaluation and was noted to have benign EMB and a cervical polyp was removed that was also benign  Patient was counseled that if PMB continued, recommendation would be for hysteroscopy and D&C for further evaluation  Patient had recent US, results that are as follows:  US pelvis complete w transvaginal    Narrative: PELVIC ULTRASOUND, COMPLETE    INDICATION:  55years old  N95 0: Postmenopausal bleeding  R10 2: Pelvic and perineal pain  COMPARISON: None    TECHNIQUE:   Transabdominal pelvic ultrasound was performed in sagittal and transverse planes with a curvilinear transducer  Additional transvaginal imaging was performed to better evaluate the endometrium and ovaries    Imaging included volumetric   sweeps as well as traditional still imaging technique  FINDINGS:    UTERUS:  The uterus is anteverted in position, measuring 8 2 x 5 1 x 3 9 cm  Mildly heterogeneous myometrial echotexture  There is a submucosal leiomyoma measuring 1 9 x 1 2 x 2 cm in the posterior aspect of uterine fundus/body  The cervix shows no suspicious abnormality  ENDOMETRIUM:    Normal caliber of 5 mm  Homogeneous and normal in appearance  OVARIES/ADNEXA:  Right ovary: The right ovary is not visualized on transabdominal or transvaginal sonographic examination  No suspicious right adnexal mass  Left ovary:  1 5 x 1 0x 0 9 cm  No suspicious left ovarian abnormality  Doppler flow within normal limits  No suspicious adnexal mass or loculated collections  There is no free fluid  Impression:    1  Mildly heterogeneous uterus with a 1 9 cm submucosal leiomyoma  2   Normal thickness endometrium measuring 5 mm  3   Nonvisualization of right ovary  No suspicious adnexal mass  The following portions of the patient's history were reviewed and updated as appropriate: allergies, current medications, past family history, past medical history, past social history, past surgical history and problem list     Review of Systems   Constitutional: Negative for activity change, appetite change and unexpected weight change  Respiratory: Negative for cough and shortness of breath  Cardiovascular: Negative for chest pain  Gastrointestinal: Negative for abdominal pain, constipation, diarrhea, nausea and vomiting  Genitourinary: Positive for menstrual problem and vaginal bleeding  Negative for difficulty urinating, dyspareunia, frequency and pelvic pain  Musculoskeletal: Negative for back pain  Neurological: Negative for dizziness, weakness, light-headedness and headaches  Psychiatric/Behavioral: Negative            Objective:  /66 (BP Location: Right arm, Patient Position: Sitting, Cuff Size: Standard)   Wt 64 4 kg (142 lb)   BMI 25 15 kg/m² Physical Exam  Constitutional:       General: She is not in acute distress  Appearance: Normal appearance  She is normal weight  She is not diaphoretic  HENT:      Head: Normocephalic and atraumatic  Cardiovascular:      Rate and Rhythm: Normal rate and regular rhythm  Heart sounds: Normal heart sounds  No murmur  No gallop  Pulmonary:      Effort: Pulmonary effort is normal  No respiratory distress  Breath sounds: Normal breath sounds  No wheezing or rhonchi  Musculoskeletal:         General: No tenderness  Right lower leg: No edema  Left lower leg: No edema  Neurological:      Mental Status: She is alert and oriented to person, place, and time  Psychiatric:         Mood and Affect: Mood normal          Behavior: Behavior normal          Thought Content: Thought content normal          Judgment: Judgment normal    Vitals signs and nursing note reviewed

## 2021-06-08 ENCOUNTER — TELEPHONE (OUTPATIENT)
Dept: GASTROENTEROLOGY | Facility: AMBULARY SURGERY CENTER | Age: 56
End: 2021-06-08

## 2021-06-09 ENCOUNTER — ANESTHESIA EVENT (OUTPATIENT)
Dept: GASTROENTEROLOGY | Facility: AMBULARY SURGERY CENTER | Age: 56
End: 2021-06-09

## 2021-06-09 ENCOUNTER — ANESTHESIA (OUTPATIENT)
Dept: GASTROENTEROLOGY | Facility: AMBULARY SURGERY CENTER | Age: 56
End: 2021-06-09

## 2021-06-09 ENCOUNTER — HOSPITAL ENCOUNTER (OUTPATIENT)
Dept: GASTROENTEROLOGY | Facility: AMBULARY SURGERY CENTER | Age: 56
Setting detail: OUTPATIENT SURGERY
Discharge: HOME | End: 2021-06-09
Attending: COLON & RECTAL SURGERY | Admitting: COLON & RECTAL SURGERY
Payer: COMMERCIAL

## 2021-06-09 VITALS
WEIGHT: 136.8 LBS | TEMPERATURE: 97.5 F | OXYGEN SATURATION: 100 % | HEIGHT: 62 IN | HEART RATE: 51 BPM | RESPIRATION RATE: 20 BRPM | DIASTOLIC BLOOD PRESSURE: 58 MMHG | BODY MASS INDEX: 25.17 KG/M2 | SYSTOLIC BLOOD PRESSURE: 120 MMHG

## 2021-06-09 DIAGNOSIS — Z12.11 SCREEN FOR COLON CANCER: ICD-10-CM

## 2021-06-09 PROCEDURE — G0121 COLON CA SCRN NOT HI RSK IND: HCPCS | Performed by: COLON & RECTAL SURGERY

## 2021-06-09 PROCEDURE — 99243 OFF/OP CNSLTJ NEW/EST LOW 30: CPT | Performed by: COLON & RECTAL SURGERY

## 2021-06-09 RX ORDER — METOCLOPRAMIDE HYDROCHLORIDE 5 MG/ML
INJECTION INTRAMUSCULAR; INTRAVENOUS AS NEEDED
Status: DISCONTINUED | OUTPATIENT
Start: 2021-06-09 | End: 2021-06-09

## 2021-06-09 RX ORDER — ONDANSETRON 2 MG/ML
INJECTION INTRAMUSCULAR; INTRAVENOUS AS NEEDED
Status: DISCONTINUED | OUTPATIENT
Start: 2021-06-09 | End: 2021-06-09

## 2021-06-09 RX ORDER — SODIUM CHLORIDE, SODIUM LACTATE, POTASSIUM CHLORIDE, CALCIUM CHLORIDE 600; 310; 30; 20 MG/100ML; MG/100ML; MG/100ML; MG/100ML
INJECTION, SOLUTION INTRAVENOUS CONTINUOUS PRN
Status: DISCONTINUED | OUTPATIENT
Start: 2021-06-09 | End: 2021-06-09

## 2021-06-09 RX ORDER — PROPOFOL 10 MG/ML
INJECTION, EMULSION INTRAVENOUS AS NEEDED
Status: DISCONTINUED | OUTPATIENT
Start: 2021-06-09 | End: 2021-06-09

## 2021-06-09 RX ORDER — LIDOCAINE HYDROCHLORIDE 10 MG/ML
INJECTION, SOLUTION EPIDURAL; INFILTRATION; INTRACAUDAL; PERINEURAL AS NEEDED
Status: DISCONTINUED | OUTPATIENT
Start: 2021-06-09 | End: 2021-06-09

## 2021-06-09 RX ADMIN — PROPOFOL 20 MG: 10 INJECTION, EMULSION INTRAVENOUS at 09:30

## 2021-06-09 RX ADMIN — SODIUM CHLORIDE, SODIUM LACTATE, POTASSIUM CHLORIDE, AND CALCIUM CHLORIDE: .6; .31; .03; .02 INJECTION, SOLUTION INTRAVENOUS at 09:16

## 2021-06-09 RX ADMIN — PROPOFOL 20 MG: 10 INJECTION, EMULSION INTRAVENOUS at 09:25

## 2021-06-09 RX ADMIN — PROPOFOL 20 MG: 10 INJECTION, EMULSION INTRAVENOUS at 09:27

## 2021-06-09 RX ADMIN — METOCLOPRAMIDE HYDROCHLORIDE 10 MG: 5 INJECTION INTRAMUSCULAR; INTRAVENOUS at 09:16

## 2021-06-09 RX ADMIN — ONDANSETRON 4 MG: 2 INJECTION INTRAMUSCULAR; INTRAVENOUS at 09:16

## 2021-06-09 RX ADMIN — Medication 40 MG: at 09:27

## 2021-06-09 RX ADMIN — LIDOCAINE HYDROCHLORIDE 50 MG: 10 INJECTION, SOLUTION EPIDURAL; INFILTRATION; INTRACAUDAL at 09:21

## 2021-06-09 RX ADMIN — PROPOFOL 20 MG: 10 INJECTION, EMULSION INTRAVENOUS at 09:37

## 2021-06-09 RX ADMIN — PROPOFOL 100 MG: 10 INJECTION, EMULSION INTRAVENOUS at 09:21

## 2021-06-09 RX ADMIN — PROPOFOL 20 MG: 10 INJECTION, EMULSION INTRAVENOUS at 09:33

## 2021-06-09 NOTE — ANESTHESIA PREPROCEDURE EVALUATION
Procedure:  COLONOSCOPY    Relevant Problems   No relevant active problems   Post menopausal bleed           Anesthesia Plan  ASA Score- 2     Anesthesia Type- IV sedation with anesthesia with ASA Monitors  Additional Monitors:   Airway Plan:     Comment: Plan for IV sedation with GETA as back up          Plan Factors-    Chart reviewed  EKG reviewed  Imaging results reviewed  Existing labs reviewed  Patient summary reviewed  Induction- intravenous  Postoperative Plan- Plan for postoperative opioid use  Planned trial extubation    Informed Consent- Anesthetic plan and risks discussed with patient  I personally reviewed this patient with the CRNA  Discussed and agreed on the Anesthesia Plan with the ESTIVEN Cope

## 2021-06-09 NOTE — DISCHARGE INSTRUCTIONS
Colonoscopy   WHAT YOU NEED TO KNOW:   A colonoscopy is a procedure to examine the inside of your colon (intestine) with a scope  Polyps or tissue growths may have been removed during your colonoscopy  It is normal to feel bloated and to have some abdominal discomfort  You should be passing gas  If you have hemorrhoids or you had polyps removed, you may have a small amount of bleeding  DISCHARGE INSTRUCTIONS:   Seek care immediately if:   · You have a large amount of bright red blood in your bowel movements  · Your abdomen is hard and firm and you have severe pain  · You have sudden trouble breathing  Contact your healthcare provider if:   · You develop a rash or hives  · You have a fever within 24 hours of your procedure       · You have not had a bowel movement for 3 days after your procedure  · You have questions or concerns about your condition or care  Activity:   · Do not lift, strain, or run  for 3 days after your procedure  · Rest after your procedure  You have been given medicine to relax you  Do not  drive or make important decisions until the day after your procedure  Return to your normal activity as directed  · Relieve gas and discomfort from bloating  by lying on your right side with a heating pad on your abdomen  You may need to take short walks to help the gas move out  Eat small meals until bloating is relieved  If you had polyps removed: For 7 days after your procedure:  · Do not  take aspirin  · Do not  go on long car rides  Follow up with your healthcare provider as directed:  Write down your questions so you remember to ask them during your visits  © 2017 [address] is for End User's use only and may not be sold, redistributed or otherwise used for commercial purposes  All illustrations and images included in CareNotes® are the copyrighted property of [employer]  or [address]    The above information is an  only  It is not intended as medical advice for individual conditions or treatments  Talk to your doctor, nurse or pharmacist before following any medical regimen to see if it is safe and effective for you

## 2021-06-09 NOTE — ANESTHESIA POSTPROCEDURE EVALUATION
Post-Op Assessment Note    CV Status:  Stable  Pain Score: 0    Pain management: adequate     Mental Status:  Sleepy   Hydration Status:  Euvolemic   PONV Controlled:  Controlled   Airway Patency:  Patent      Post Op Vitals Reviewed: Yes      Staff: CRNA         No complications documented      BP   97/56   Temp     Pulse 46   Resp 12   SpO2 98% RA

## 2021-06-09 NOTE — H&P
History and Physical   Colon and Rectal Surgery   Stiven Akbar 55 y o  female MRN: [id number]  Unit/Bed#:  Encounter: [id number]  21   9:03 AM      No chief complaint on file  ASSESSMENT:  Stiven Akbar is a 55 y o  female who presents, reports no prior colonoscopy on interview today, denies family history colon polyps or cancer  She has colo guard positive 2021  Screening colonoscopy,discussed in a face-to-face, personal, informed consent process, the benefits, alternatives, risks including not limited to bleeding, missed lesion, perforation requiring emergent surgery discussed/understood  PLAN:  Colonoscopy    History of Present Illness   HPI:  Stiven Akbar is a 55 y o  female who presents for screening colonoscopy  Cologuard positive 2021  Denies nausea/vomiting/abdominal pain, change in bowel habits, rectal bleeding, or other constitutional symptoms         Historical Information   Past Medical History:   Diagnosis Date    Bug bite 2020    Rash 2020    Varicella      Past Surgical History:   Procedure Laterality Date     SECTION      POLYPECTOMY      Sinus       Meds/Allergies     (Not in a hospital admission)        Current Outpatient Medications:     calcium carbonate (OS-ARMANDO) 600 MG tablet, Take 600 mg by mouth 2 (two) times a day with meals, Disp: , Rfl:     Glucosamine-Chondroitin (MOVE FREE PO), Take by mouth, Disp: , Rfl:     Allergies   Allergen Reactions    Sulfa Antibiotics Rash         Social History   Social History     Substance and Sexual Activity   Alcohol Use No     Social History     Substance and Sexual Activity   Drug Use No     Social History     Tobacco Use   Smoking Status Never Smoker   Smokeless Tobacco Never Used         Family History:   Family History   Problem Relation Age of Onset    Hypertension Mother     No Known Problems Father     No Known Problems Daughter     No Known Problems Maternal Grandmother     No Known Problems Maternal Grandfather     No Known Problems Paternal Grandmother     No Known Problems Paternal Grandfather     No Known Problems Son          Objective     Current Vitals:   Blood Pressure: 117/73 (06/09/21 0845)  Pulse: (!) 51(sb) (06/09/21 0845)  Temperature: (!) 97 °F (36 1 °C) (06/09/21 0845)  Temp Source: Temporal (06/09/21 0845)  Respirations: 16 (06/09/21 0845)  Height: 5' 2" (157 5 cm) (06/09/21 0845)  Weight - Scale: 62 1 kg (136 lb 12 8 oz) (06/09/21 0845)  SpO2: 98 % (06/09/21 0845)  No intake or output data in the 24 hours ending 06/09/21 0903    Physical Exam:  General:no distress  Eyes:perrla/eomi  ENT:moist mucus membranes  Neck:supple  Pulm:no increased work of breathing  CV:sinus  Abdomen:soft,nontender

## 2021-06-11 ENCOUNTER — PREP FOR PROCEDURE (OUTPATIENT)
Dept: OBGYN CLINIC | Facility: CLINIC | Age: 56
End: 2021-06-11

## 2021-06-11 DIAGNOSIS — N95.0 POSTMENOPAUSAL BLEEDING: Primary | ICD-10-CM

## 2021-06-11 RX ORDER — GABAPENTIN 100 MG/1
100 CAPSULE ORAL ONCE
Status: CANCELLED | OUTPATIENT
Start: 2021-06-11 | End: 2021-06-11

## 2021-06-11 RX ORDER — ACETAMINOPHEN 325 MG/1
975 TABLET ORAL ONCE
Status: CANCELLED | OUTPATIENT
Start: 2021-06-11 | End: 2021-06-11

## 2021-06-18 ENCOUNTER — ANESTHESIA EVENT (OUTPATIENT)
Dept: PERIOP | Facility: AMBULARY SURGERY CENTER | Age: 56
End: 2021-06-18
Payer: COMMERCIAL

## 2021-06-20 NOTE — ANESTHESIA PREPROCEDURE EVALUATION
Procedure:  DILATATION AND CURETTAGE (D&C) WITH HYSTEROSCOPY (MYOSURE) (N/A Uterus)    Relevant Problems   ANESTHESIA (within normal limits)      CARDIO (within normal limits)      ENDO (within normal limits)      GI/HEPATIC (within normal limits)      /RENAL (within normal limits)      HEMATOLOGY (within normal limits)      NEURO/PSYCH (within normal limits)      PULMONARY (within normal limits)      Other   (+) Postmenopausal bleeding      No results found for: WBC, HGB, HCT, MCV, PLT  No results found for: SODIUM, K, CL, CO2, BUN, CREATININE, GLUC, CALCIUM  No results found for: INR, PROTIME  No results found for: HGBA1C         Physical Exam    Airway    Mallampati score: II  TM Distance: >3 FB  Neck ROM: full     Dental   No notable dental hx     Cardiovascular  Cardiovascular exam normal    Pulmonary  Pulmonary exam normal     Other Findings        Anesthesia Plan  ASA Score- 2     Anesthesia Type- general with ASA Monitors  Additional Monitors:   Airway Plan: LMA  Plan Factors-Exercise tolerance (METS): >4 METS  Chart reviewed  Patient summary reviewed  Induction- intravenous  Postoperative Plan-     Informed Consent- Anesthetic plan and risks discussed with patient  I personally reviewed this patient with the CRNA  Discussed and agreed on the Anesthesia Plan with the CRNA  Karen Mandujano

## 2021-06-21 ENCOUNTER — ANESTHESIA (OUTPATIENT)
Dept: PERIOP | Facility: AMBULARY SURGERY CENTER | Age: 56
End: 2021-06-21
Payer: COMMERCIAL

## 2021-06-21 ENCOUNTER — HOSPITAL ENCOUNTER (OUTPATIENT)
Facility: AMBULARY SURGERY CENTER | Age: 56
Setting detail: OUTPATIENT SURGERY
Discharge: HOME | End: 2021-06-21
Attending: OBSTETRICS & GYNECOLOGY | Admitting: OBSTETRICS & GYNECOLOGY
Payer: COMMERCIAL

## 2021-06-21 VITALS
TEMPERATURE: 97.6 F | OXYGEN SATURATION: 97 % | SYSTOLIC BLOOD PRESSURE: 132 MMHG | WEIGHT: 139 LBS | HEIGHT: 62 IN | DIASTOLIC BLOOD PRESSURE: 78 MMHG | HEART RATE: 56 BPM | RESPIRATION RATE: 17 BRPM | BODY MASS INDEX: 25.58 KG/M2

## 2021-06-21 DIAGNOSIS — N95.0 POSTMENOPAUSAL BLEEDING: ICD-10-CM

## 2021-06-21 PROBLEM — Z98.890 S/P D&C (STATUS POST DILATION AND CURETTAGE): Status: ACTIVE | Noted: 2021-06-21

## 2021-06-21 PROCEDURE — 88305 TISSUE EXAM BY PATHOLOGIST: CPT | Performed by: PATHOLOGY

## 2021-06-21 PROCEDURE — 58558 HYSTEROSCOPY BIOPSY: CPT | Performed by: OBSTETRICS & GYNECOLOGY

## 2021-06-21 RX ORDER — HYDROMORPHONE HCL/PF 1 MG/ML
0.2 SYRINGE (ML) INJECTION
Status: DISCONTINUED | OUTPATIENT
Start: 2021-06-21 | End: 2021-06-21 | Stop reason: HOSPADM

## 2021-06-21 RX ORDER — PROPOFOL 10 MG/ML
INJECTION, EMULSION INTRAVENOUS CONTINUOUS PRN
Status: DISCONTINUED | OUTPATIENT
Start: 2021-06-21 | End: 2021-06-21

## 2021-06-21 RX ORDER — EPHEDRINE SULFATE 50 MG/ML
INJECTION INTRAVENOUS AS NEEDED
Status: DISCONTINUED | OUTPATIENT
Start: 2021-06-21 | End: 2021-06-21

## 2021-06-21 RX ORDER — MAGNESIUM HYDROXIDE 1200 MG/15ML
LIQUID ORAL AS NEEDED
Status: DISCONTINUED | OUTPATIENT
Start: 2021-06-21 | End: 2021-06-21 | Stop reason: HOSPADM

## 2021-06-21 RX ORDER — ONDANSETRON 2 MG/ML
INJECTION INTRAMUSCULAR; INTRAVENOUS AS NEEDED
Status: DISCONTINUED | OUTPATIENT
Start: 2021-06-21 | End: 2021-06-21

## 2021-06-21 RX ORDER — FENTANYL CITRATE/PF 50 MCG/ML
25 SYRINGE (ML) INJECTION
Status: DISCONTINUED | OUTPATIENT
Start: 2021-06-21 | End: 2021-06-21 | Stop reason: HOSPADM

## 2021-06-21 RX ORDER — GABAPENTIN 100 MG/1
100 CAPSULE ORAL ONCE
Status: COMPLETED | OUTPATIENT
Start: 2021-06-21 | End: 2021-06-21

## 2021-06-21 RX ORDER — KETOROLAC TROMETHAMINE 30 MG/ML
INJECTION, SOLUTION INTRAMUSCULAR; INTRAVENOUS AS NEEDED
Status: DISCONTINUED | OUTPATIENT
Start: 2021-06-21 | End: 2021-06-21

## 2021-06-21 RX ORDER — ACETAMINOPHEN 325 MG/1
975 TABLET ORAL ONCE
Status: COMPLETED | OUTPATIENT
Start: 2021-06-21 | End: 2021-06-21

## 2021-06-21 RX ORDER — SODIUM CHLORIDE, SODIUM LACTATE, POTASSIUM CHLORIDE, CALCIUM CHLORIDE 600; 310; 30; 20 MG/100ML; MG/100ML; MG/100ML; MG/100ML
INJECTION, SOLUTION INTRAVENOUS CONTINUOUS PRN
Status: DISCONTINUED | OUTPATIENT
Start: 2021-06-21 | End: 2021-06-21

## 2021-06-21 RX ORDER — ACETAMINOPHEN 325 MG/1
650 TABLET ORAL EVERY 6 HOURS PRN
Status: DISCONTINUED | OUTPATIENT
Start: 2021-06-21 | End: 2021-06-21 | Stop reason: HOSPADM

## 2021-06-21 RX ORDER — ONDANSETRON 2 MG/ML
4 INJECTION INTRAMUSCULAR; INTRAVENOUS ONCE AS NEEDED
Status: DISCONTINUED | OUTPATIENT
Start: 2021-06-21 | End: 2021-06-21 | Stop reason: HOSPADM

## 2021-06-21 RX ORDER — PROPOFOL 10 MG/ML
INJECTION, EMULSION INTRAVENOUS AS NEEDED
Status: DISCONTINUED | OUTPATIENT
Start: 2021-06-21 | End: 2021-06-21

## 2021-06-21 RX ORDER — FENTANYL CITRATE 50 UG/ML
INJECTION, SOLUTION INTRAMUSCULAR; INTRAVENOUS AS NEEDED
Status: DISCONTINUED | OUTPATIENT
Start: 2021-06-21 | End: 2021-06-21

## 2021-06-21 RX ORDER — IBUPROFEN 600 MG/1
600 TABLET ORAL EVERY 6 HOURS PRN
Status: DISCONTINUED | OUTPATIENT
Start: 2021-06-21 | End: 2021-06-21 | Stop reason: HOSPADM

## 2021-06-21 RX ORDER — DEXAMETHASONE SODIUM PHOSPHATE 10 MG/ML
INJECTION, SOLUTION INTRAMUSCULAR; INTRAVENOUS AS NEEDED
Status: DISCONTINUED | OUTPATIENT
Start: 2021-06-21 | End: 2021-06-21

## 2021-06-21 RX ORDER — LIDOCAINE HYDROCHLORIDE 10 MG/ML
INJECTION, SOLUTION EPIDURAL; INFILTRATION; INTRACAUDAL; PERINEURAL AS NEEDED
Status: DISCONTINUED | OUTPATIENT
Start: 2021-06-21 | End: 2021-06-21

## 2021-06-21 RX ADMIN — PROPOFOL 150 MG: 10 INJECTION, EMULSION INTRAVENOUS at 12:42

## 2021-06-21 RX ADMIN — SODIUM CHLORIDE, SODIUM LACTATE, POTASSIUM CHLORIDE, AND CALCIUM CHLORIDE: .6; .31; .03; .02 INJECTION, SOLUTION INTRAVENOUS at 12:38

## 2021-06-21 RX ADMIN — ACETAMINOPHEN 975 MG: 325 TABLET ORAL at 10:40

## 2021-06-21 RX ADMIN — FENTANYL CITRATE 25 MCG: 50 INJECTION, SOLUTION INTRAMUSCULAR; INTRAVENOUS at 12:50

## 2021-06-21 RX ADMIN — GABAPENTIN 100 MG: 100 CAPSULE ORAL at 10:40

## 2021-06-21 RX ADMIN — DEXAMETHASONE SODIUM PHOSPHATE 4 MG: 10 INJECTION, SOLUTION INTRAMUSCULAR; INTRAVENOUS at 12:56

## 2021-06-21 RX ADMIN — LIDOCAINE HYDROCHLORIDE 50 MG: 10 INJECTION, SOLUTION EPIDURAL; INFILTRATION; INTRACAUDAL at 12:42

## 2021-06-21 RX ADMIN — EPHEDRINE SULFATE 5 MG: 50 INJECTION, SOLUTION INTRAVENOUS at 12:56

## 2021-06-21 RX ADMIN — KETOROLAC TROMETHAMINE 30 MG: 30 INJECTION, SOLUTION INTRAMUSCULAR at 13:15

## 2021-06-21 RX ADMIN — ONDANSETRON 4 MG: 2 INJECTION INTRAMUSCULAR; INTRAVENOUS at 12:56

## 2021-06-21 NOTE — OP NOTE
OPERATIVE REPORT  PATIENT NAME: Bladimir Lyle    :  1965  MRN: [id number]  Pt Location: [facility] OR [facility]OM 04    SURGERY DATE: 2021    Surgeon(s) and Role:     * Sina Hathaway MD - Primary     * Edilson Aguilar MD - Assisting    Preop Diagnosis:  Postmenopausal bleeding [N95 0]    Post-Op Diagnosis Codes:     * Postmenopausal bleeding [N95 0]    Procedure(s) (LRB):  DILATATION AND CURETTAGE (D&C) WITH HYSTEROSCOPY (MYOSURE) (N/A)    Specimen(s):  ID Type Source Tests Collected by Time Destination   1 : Endometrial currettings Tissue Soft Tissue, Other TISSUE EXAM Sina Hathaway MD 2021 1254    2 : Uterine polyp Tissue Soft Tissue, Other TISSUE EXAM Sina Hathaway MD 2021 1305        Estimated Blood Loss:   5cc    Drains:  None    Anesthesia Type:   General LMA    Operative Indications:  Postmenopausal bleeding [N95 0]    Operative Findings:  1  Normal external female genitalia  2  Anteverted, normal sized uterus  3  Grossly normal cervix  4  Uterus sounded to 7cm in mid-position  5  Endometrial polyp originating near the right tubal os  6  Endometrium otherwise atrophic in appearance throughout  7  Bilateral tubal ostia visualized  8  Fluid deficit at end of procedure 008EF    Complications:   None apparent    Procedure and Technique: In the operating room the correct patient and procedure were identified  General LMA anesthesia (LMA) was administered and the patient was positioned on the OR table in the dorsal lithotomy position  All pressure points were padded and a kenneth hugger was placed to maintain control of core body temperature  A bimanual exam was performed and the uterus was noted to be anteverted, normal in size and consistency with no palpable adnexal masses or fullness  The patient was prepped and draped in the usual sterile fashion  A surgical timeout was performed  A straight catheter was introduced into the bladder, which was drained of 100cc of clear yellow urine   Penny Limon retractor was inserted into the vagina and a Lakshmi retractor was used to visualize the anterior lip of the cervix, which was then grasped with a single toothed tenaculum  The uterus was sounded to 7cm  The cervix was serially dilated to for introduction of the hysteroscope  Hysteroscope was introduced under direct visualization using normal saline solution as the distention media  Hysteroscope was advanced to the uterine fundus and the entire uterine cavity was inspected in a systematic manner  There was noted to be an endometrial polyp originating near the right tubal os  The endometrium appeared otherwise atrophic  Hysteroscope was withdrawn  The polyp forceps were introduced and used to grasp the endometrial polyp  Sharp curetting was then performed, starting at the 12 o'clock position and rotating a total of 360 degrees to cover all surfaces  The hysteroscope was then re-introduced under direct visualization, again using normal saline solution as the distention media  Entire uterine cavity was inspected in a systematic manner  The stalk of the polyp had not been completely excised but adequate curetting was otherwise noted  The Myosure Lite was then introduced through the hysteroscope and used to amputate the stalk of the polyp  All specimens were senty to pathology  The single toothed tenaculum was removed from the anterior lip of the cervix  Good hemostasis was confirmed at the tenaculum puncture sites  Fluid deficit at the end of the procedure was 195cc  At the conclusion of the procedure, all needle, sponge, and instrument counts were noted to be correct x2  Dr Peace Vasques was present and participated in all key portions of the case      Patient Disposition:  PACU      SIGNATURE: Lluvia Gonzales MD  DATE: June 21, 2021  TIME: 1:23 PM

## 2021-06-21 NOTE — INTERVAL H&P NOTE
H&P reviewed  After examining the patient I find no changes in the patients condition since the H&P had been written      Vitals:    06/21/21 1036   BP: 115/66   Pulse: (!) 51   Resp: 18   Temp: (!) 97 °F (36 1 °C)   SpO2: 96%     Abdomen: soft, nontender to palpation, no guarding and no masses

## 2021-06-21 NOTE — ANESTHESIA POSTPROCEDURE EVALUATION
Post-Op Assessment Note    CV Status:  Stable  Pain Score: 0    Pain management: adequate     Mental Status:  Alert and awake   Hydration Status:  Euvolemic   PONV Controlled:  Controlled   Airway Patency:  Patent      Post Op Vitals Reviewed: Yes      Staff: Anesthesiologist, CRNA         No complications documented      BP   120/68   Temp  96 8   Pulse 50   Resp   14   SpO2   100

## 2021-06-21 NOTE — DISCHARGE INSTRUCTIONS
CHECK ONBASE FOR ATTACHMENT: Colonoscopy.pdf    Please click on link to see image.     Dilation and Curettage   WHAT YOU NEED TO KNOW:   Dilation and curettage (D&C) is a procedure to remove tissue from the lining of your uterus  DISCHARGE INSTRUCTIONS:   Call 911 for any of the following:   · You have signs of an allergic reaction, such as hives, trouble breathing, or severe swelling  Seek care immediately if:   · You have heavy vaginal bleeding that soaks 1 pad in 1 hour for 2 hours in a row  · You have a fever higher than 100 4°F (38°C)  · You have abdominal cramps for more than 2 days  · Your pain does not get better, even after treatment  Contact your healthcare provider if:   · You have foul-smelling vaginal discharge  · You do not get your monthly period  · You feel depressed or anxious  · You feel very tired and weak  · You have questions or concerns about your condition or care  Medicines: You may need any of the following:  · Prescription pain medicine  may be given  Do not wait until the pain is severe before you take your medicine  Ask your healthcare provider how to take this medicine safely  · Antibiotics  help fight or prevent a bacterial infection  · Take your medicine as directed  Contact your healthcare provider if you think your medicine is not helping or if you have side effects  Tell him or her if you are allergic to any medicine  Keep a list of the medicines, vitamins, and herbs you take  Include the amounts, and when and why you take them  Bring the list or the pill bottles to follow-up visits  Carry your medicine list with you in case of an emergency  Self-care:   · Use sanitary pads if needed  You may have light bleeding for up to 2 weeks  Do not use tampons  Use sanitary pads instead  This will help prevent a vaginal infection  · Rest as needed  Slowly start to do more each day  Return to your daily activities as directed  · Do not have sex for at least 2 weeks after the procedure    This will help prevent an infection  · Use birth control right after your procedure  Your monthly period should start again in 4 to 8 weeks  During this time, you could still ovulate (release an egg)  Use birth control as directed to prevent pregnancy during this time  Follow up with your healthcare provider as directed:  Write down your questions so you remember to ask them during your visits  © Copyright [address] Information is for End User's use only and may not be sold, redistributed or otherwise used for commercial purposes  All illustrations and images included in CareNotes® are the copyrighted property of A D A [employer]  or [address] [place], [place] [address]soumya   The above information is an  only  It is not intended as medical advice for individual conditions or treatments  Talk to your doctor, nurse or pharmacist before following any medical regimen to see if it is safe and effective for you

## 2021-07-14 ENCOUNTER — OFFICE VISIT (OUTPATIENT)
Dept: OBGYN CLINIC | Facility: CLINIC | Age: 56
End: 2021-07-14

## 2021-07-14 VITALS — BODY MASS INDEX: 25.57 KG/M2 | DIASTOLIC BLOOD PRESSURE: 70 MMHG | WEIGHT: 139.8 LBS | SYSTOLIC BLOOD PRESSURE: 110 MMHG

## 2021-07-14 DIAGNOSIS — N95.0 POSTMENOPAUSAL BLEEDING: ICD-10-CM

## 2021-07-14 DIAGNOSIS — Z98.890 S/P D&C (STATUS POST DILATION AND CURETTAGE): Primary | ICD-10-CM

## 2021-07-14 DIAGNOSIS — N84.0 ENDOMETRIAL POLYP: ICD-10-CM

## 2021-07-14 PROCEDURE — 99024 POSTOP FOLLOW-UP VISIT: CPT | Performed by: OBSTETRICS & GYNECOLOGY

## 2021-07-14 NOTE — PROGRESS NOTES
Assessment/Plan:   Diagnoses and all orders for this visit:    S/P D&C (status post dilation and curettage)  - doing well  No evidence of infection  Postmenopausal bleeding  - secondary to endometrial polyp found on hysteroscopy  Pt has hx of multiple nasal polyps that have required removal as well  - pathology reviewed  Benign  Can monitor for PMB prn  Follow up for annual gyn exam  Endometrial polyp        Subjective:    Patient ID: Floyd Sepulveda is a 55 y o  female  Chief Complaint   Patient presents with    Post-op     Pt is 3wks post D&C  Pt has no problems  James Mensah is a 50yo  postmenopausal patient presenting for postoperative check after her hysteroscopy and D&C procedure  Reviewed the details of the procedure and operative findings, specifically that an endometrial polyp was found and removed from her uterine cavity  Endometrial polyp is likely the cause of her PMB  She reports that she is doing well and has no complaints  She denies fevers, chills, and sweats  She states that she had some light spotting and bleeding after the procedure for about 5 days that has since resolved  She is also appreciative of the recommendation for pelvic physical therapy and states that she has been seeing a PT for her pelvic pain and is very impressed with the amount of help that the exercises have provided  The following portions of the patient's history were reviewed and updated as appropriate: allergies, current medications, past family history, past medical history, past social history, past surgical history and problem list     Review of Systems   Constitutional: Negative for chills, diaphoresis and fever  Respiratory: Negative for cough and shortness of breath  Cardiovascular: Negative for chest pain  Gastrointestinal: Negative for abdominal pain, constipation, diarrhea, nausea and vomiting  Genitourinary: Positive for vaginal bleeding   Negative for difficulty urinating, menstrual problem, pelvic pain, vaginal discharge and vaginal pain  Objective:  /70 (BP Location: Right arm, Patient Position: Sitting, Cuff Size: Standard)   Wt 63 4 kg (139 lb 12 8 oz)   BMI 25 57 kg/m²   Physical Exam  Constitutional:       General: She is not in acute distress  Appearance: Normal appearance  She is normal weight  She is not diaphoretic  HENT:      Head: Normocephalic and atraumatic  Pulmonary:      Effort: Pulmonary effort is normal  No respiratory distress  Musculoskeletal:         General: Normal range of motion  Right lower leg: No edema  Left lower leg: No edema  Neurological:      Mental Status: She is alert and oriented to person, place, and time  Skin:     General: Skin is warm and dry  Psychiatric:         Mood and Affect: Mood normal          Behavior: Behavior normal          Thought Content:  Thought content normal          Judgment: Judgment normal

## 2021-11-04 PROBLEM — H90.3 SENSORINEURAL HEARING LOSS (SNHL) OF BOTH EARS: Status: ACTIVE | Noted: 2021-11-04

## 2021-11-04 PROBLEM — J32.8 OTHER CHRONIC SINUSITIS: Status: ACTIVE | Noted: 2021-11-04

## 2022-02-01 ENCOUNTER — OFFICE VISIT (OUTPATIENT)
Dept: INTERNAL MEDICINE CLINIC | Facility: CLINIC | Age: 57
End: 2022-02-01
Payer: COMMERCIAL

## 2022-02-01 VITALS
WEIGHT: 139.2 LBS | HEART RATE: 64 BPM | OXYGEN SATURATION: 98 % | RESPIRATION RATE: 16 BRPM | HEIGHT: 63 IN | SYSTOLIC BLOOD PRESSURE: 110 MMHG | BODY MASS INDEX: 24.66 KG/M2 | DIASTOLIC BLOOD PRESSURE: 58 MMHG | TEMPERATURE: 97.3 F

## 2022-02-01 DIAGNOSIS — Z00.00 LABORATORY EXAM ORDERED AS PART OF ROUTINE GENERAL MEDICAL EXAMINATION: ICD-10-CM

## 2022-02-01 DIAGNOSIS — Z13.1 SCREENING FOR DIABETES MELLITUS: ICD-10-CM

## 2022-02-01 DIAGNOSIS — Z00.00 ANNUAL PHYSICAL EXAM: Primary | ICD-10-CM

## 2022-02-01 DIAGNOSIS — E78.2 MIXED HYPERLIPIDEMIA: ICD-10-CM

## 2022-02-01 PROCEDURE — 3008F BODY MASS INDEX DOCD: CPT | Performed by: INTERNAL MEDICINE

## 2022-02-01 PROCEDURE — 1036F TOBACCO NON-USER: CPT | Performed by: INTERNAL MEDICINE

## 2022-02-01 PROCEDURE — 99396 PREV VISIT EST AGE 40-64: CPT | Performed by: INTERNAL MEDICINE

## 2022-02-01 NOTE — PROGRESS NOTES
Assessment/Plan:    No problem-specific Assessment & Plan notes found for this encounter  Problem List Items Addressed This Visit     None      Visit Diagnoses     Encounter for immunization    -  Primary            Subjective:      Patient ID: Onelia Beckford is a 55 y o  female      HPI    The following portions of the patient's history were reviewed and updated as appropriate: allergies, current medications, past family history, past medical history, past social history, past surgical history and problem list     Review of Systems      Objective:      /58   Pulse 64   Temp (!) 97 3 °F (36 3 °C)   Resp 16   Ht 5' 2 5" (1 588 m)   Wt 63 1 kg (139 lb 3 2 oz)   SpO2 98%   BMI 25 05 kg/m²          Physical Exam

## 2022-02-01 NOTE — PROGRESS NOTES
[facility]    NAME: Stiven Akbar  AGE: 55 y o  SEX: female  : 1965     DATE: 2022     Assessment and Plan:     Problem List Items Addressed This Visit     None      Visit Diagnoses     Annual physical exam    -  Primary    Mixed hyperlipidemia        Relevant Orders    Lipid Panel with Direct LDL reflex    TSH, 3rd generation with Free T4 reflex    Screening for diabetes mellitus        Relevant Orders    HEMOGLOBIN A1C W/ EAG ESTIMATION    Laboratory exam ordered as part of routine general medical examination        Relevant Orders    CBC and differential    Comprehensive metabolic panel          Immunizations and preventive care screenings were discussed with patient today  Appropriate education was printed on patient's after visit summary  She will check her records for Adacel and Shingrix    Counseling:  · Maintain healthy diet and continue regular exercise  Conservative treatment for joint aches  NSAIDs PRN  BMI Counseling: Body mass index is 25 05 kg/m²  The BMI is above normal  Nutrition recommendations include moderation in carbohydrate intake and reducing intake of saturated fat and trans fat  Return in about 1 year (around 2023)  Chief Complaint:     Chief Complaint   Patient presents with    Physical Exam      History of Present Illness:     Adult Annual Physical   Patient here for a comprehensive physical exam  The patient reports problems - joint aches  Left SI area, outer left knee, right ankle  She is able to continue regular exercise    Diet and Physical Activity  · Diet/Nutrition: consuming 3-5 servings of fruits/vegetables daily and high in meat  · Exercise: 5-7 times a week on average and plays badminton 3 days and yoga twice a week        Depression Screening  PHQ-2/9 Depression Screening    Little interest or pleasure in doing things: 0 - not at all  Feeling down, depressed, or hopeless: 0 - not at all  PHQ-2 Score: 0  PHQ-2 Interpretation: Negative depression screen       General Health  · Sleep: sleeps well  · Hearing: normal - bilateral   · Vision: most recent eye exam >1 year ago and wears glasses  · Dental: regular dental visits  /GYN Health  · Patient is: postmenopausal     Review of Systems:     Review of Systems   Constitutional: Negative for chills, fatigue, fever and unexpected weight change  Respiratory: Negative for cough and shortness of breath  Cardiovascular: Negative for chest pain, palpitations and leg swelling  Gastrointestinal: Negative for abdominal pain, constipation and diarrhea  Genitourinary: Negative for difficulty urinating  Musculoskeletal: Positive for arthralgias (left SI left hip ; ankles right>left)  Neurological: Negative for dizziness and headaches  Past Medical History:     Past Medical History:   Diagnosis Date    Bug bite 2020    Rash 2020    Varicella       Past Surgical History:     Past Surgical History:   Procedure Laterality Date     SECTION      POLYPECTOMY      Sinus    UT HYSTEROSCOPY,W/ENDO BX N/A 2021    Procedure: DILATATION AND CURETTAGE (D&C) WITH HYSTEROSCOPY (MYOSURE);   Surgeon: Curly Flores MD;  Location: [facility] MA[facility] OR;  Service: Gynecology      Social History:     Social History     Socioeconomic History    Marital status: /Civil Union     Spouse name: None    Number of children: None    Years of education: None    Highest education level: None   Occupational History    None   Tobacco Use    Smoking status: Never Smoker    Smokeless tobacco: Never Used   Vaping Use    Vaping Use: Never used   Substance and Sexual Activity    Alcohol use: No    Drug use: No    Sexual activity: Yes     Partners: Male     Birth control/protection: Post-menopausal   Other Topics Concern    None   Social History Narrative    None     Social Determinants of Health     Financial Resource Strain: Not on file   Food Insecurity: Not on file   Transportation Needs: Not on file   Physical Activity: Not on file   Stress: Not on file   Social Connections: Not on file   Intimate Partner Violence: Not on file   Housing Stability: Not on file      Family History:     Family History   Problem Relation Age of Onset    Hypertension Mother     Hypertension Father     No Known Problems Daughter     No Known Problems Maternal Grandmother     No Known Problems Maternal Grandfather     No Known Problems Paternal Grandmother     No Known Problems Paternal Grandfather     No Known Problems Son       Current Medications:     Current Outpatient Medications   Medication Sig Dispense Refill    calcium carbonate (OS-ARMANDO) 600 MG tablet Take 600 mg by mouth 2 (two) times a day with meals      Glucosamine-Chondroitin (MOVE FREE PO) Take by mouth      Fluticasone Propionate (Xhance) 93 MCG/ACT EXHU 1 actuation into each nostril 2 (two) times a day (Patient not taking: Reported on 2/1/2022 ) 16 mL 5     No current facility-administered medications for this visit  Allergies: Allergies   Allergen Reactions    Sulfa Antibiotics Rash      Physical Exam:     /58   Pulse 64   Temp (!) 97 3 °F (36 3 °C)   Resp 16   Ht 5' 2 5" (1 588 m)   Wt 63 1 kg (139 lb 3 2 oz)   SpO2 98%   BMI 25 05 kg/m²     Physical Exam  Constitutional:       General: She is not in acute distress  Appearance: She is well-developed  She is not ill-appearing, toxic-appearing or diaphoretic  HENT:      Head: Normocephalic and atraumatic  Right Ear: External ear normal  There is no impacted cerumen  Left Ear: External ear normal  There is no impacted cerumen  Eyes:      Conjunctiva/sclera: Conjunctivae normal    Cardiovascular:      Rate and Rhythm: Normal rate and regular rhythm  Heart sounds: Normal heart sounds  No murmur heard        Pulmonary:      Effort: Pulmonary effort is normal  No respiratory distress  Breath sounds: Normal breath sounds  No stridor  No wheezing or rales  Abdominal:      General: There is no distension  Palpations: Abdomen is soft  There is no mass  Tenderness: There is no abdominal tenderness  There is no guarding or rebound  Musculoskeletal:      Cervical back: Neck supple  Right lower leg: No edema  Left lower leg: No edema  Skin:     General: Skin is warm and dry  Neurological:      Mental Status: She is alert and oriented to person, place, and time  Psychiatric:         Mood and Affect: Mood normal          Behavior: Behavior normal          Thought Content:  Thought content normal          Judgment: Judgment normal           Bhavana Almeida MD  [facility]

## 2022-02-01 NOTE — PATIENT INSTRUCTIONS

## 2022-12-12 ENCOUNTER — OFFICE VISIT (OUTPATIENT)
Dept: INTERNAL MEDICINE CLINIC | Facility: CLINIC | Age: 57
End: 2022-12-12

## 2022-12-12 ENCOUNTER — HOSPITAL ENCOUNTER (OUTPATIENT)
Dept: RADIOLOGY | Facility: HOSPITAL | Age: 57
Discharge: HOME | End: 2022-12-12

## 2022-12-12 VITALS
HEIGHT: 63 IN | HEART RATE: 58 BPM | DIASTOLIC BLOOD PRESSURE: 78 MMHG | WEIGHT: 144 LBS | OXYGEN SATURATION: 98 % | BODY MASS INDEX: 25.52 KG/M2 | TEMPERATURE: 97.6 F | SYSTOLIC BLOOD PRESSURE: 104 MMHG

## 2022-12-12 DIAGNOSIS — M79.672 LEFT FOOT PAIN: ICD-10-CM

## 2022-12-12 DIAGNOSIS — M79.672 LEFT FOOT PAIN: Primary | ICD-10-CM

## 2022-12-12 NOTE — PROGRESS NOTES
Assessment/Plan:  Suspect sprain   Elena it has been since Sept, obtain XR and establish with podiatry       Problem List Items Addressed This Visit    None  Visit Diagnoses     Left foot pain    -  Primary    Relevant Orders    XR foot 3+ vw left (Completed)    Ambulatory Referral to Podiatry            Subjective:      Patient ID: Merrick Peoples is a 65 y o  female  HPI  Avid badminton player  Feet and ankles started bothering her in Sept  Pain in the ball of the left foot   Palpable lump that is not tender    The following portions of the patient's history were reviewed and updated as appropriate: allergies, current medications, past family history, past medical history, past social history, past surgical history and problem list     Review of Systems   Musculoskeletal: Positive for arthralgias  Negative for joint swelling  Objective:      /78 (BP Location: Left arm, Patient Position: Sitting, Cuff Size: Adult)   Pulse 58   Temp 97 6 °F (36 4 °C) (Probe)   Ht 5' 2 5" (1 588 m)   Wt 65 3 kg (144 lb)   SpO2 98%   BMI 25 92 kg/m²          Physical Exam  Musculoskeletal:         General: No swelling or tenderness  Right lower leg: No edema  Left lower leg: No edema        Comments: Left ankle: no swelling, tenderness, full ROM  Small nontender palpable mass between the distal 4th and 5th metatarsals

## 2022-12-12 NOTE — LETTER
2022    Moon Martinez    1965     She is under my professional care  I am recommending her to attend classes to help with her joint pain       Sincerely,     Pancho Giraldo MD

## 2022-12-29 ENCOUNTER — APPOINTMENT (OUTPATIENT)
Dept: RADIOLOGY | Age: 57
End: 2022-12-29

## 2022-12-29 ENCOUNTER — OFFICE VISIT (OUTPATIENT)
Dept: PODIATRY | Facility: CLINIC | Age: 57
End: 2022-12-29

## 2022-12-29 DIAGNOSIS — M20.5X1 ACQUIRED ADDUCTOVARUS ROTATION OF TOE OF RIGHT FOOT: ICD-10-CM

## 2022-12-29 DIAGNOSIS — M72.2 PLANTAR FASCIAL FIBROMATOSIS OF LEFT FOOT: ICD-10-CM

## 2022-12-29 DIAGNOSIS — S93.401A MODERATE RIGHT ANKLE SPRAIN, INITIAL ENCOUNTER: ICD-10-CM

## 2022-12-29 DIAGNOSIS — S93.401A MODERATE RIGHT ANKLE SPRAIN, INITIAL ENCOUNTER: Primary | ICD-10-CM

## 2022-12-29 DIAGNOSIS — M79.672 LEFT FOOT PAIN: ICD-10-CM

## 2022-12-29 NOTE — PROGRESS NOTES
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    Plantar fascial fibromatosis of left foot      Diagnosis and options discussed with patient  Patient agreeable to the plan as stated below  Multiple concerns addressed    Chronic right ankle sprain  X-rays reviewed with patient, no acute findings but she has clear tenderness and mild instability of the ATFL ligament  Recommend aggressive physical therapy  Prescription provided  Check in 6 weeks  If no improvement consider MRI  Right fifth adductovarus toe deformity with prominent proximal phalanx head  Conservative care involves silicon toe pads which were provided  She could consider derotational arthroplasty of the digit but the moment seems content with the explanation  Small plantar fibroma left foot  It is just proximal to the sesamoid apparatus  These are benign lesions and asymptomatic in her case  I did review her foot x-ray with her  There are no acute bony findings and no need for any aggressive intervention for the small fibroma  Subjective:      Patient ID: Thang Machado is a 65 y o  female  PAtient sprained her right last September  She has actually sprained it numerous times in the past  She is still getting some discomfort on the medial and lateral ankle  She plays badminton a lot and does yoga  She is still getting a lot of ankle stiffness  LEFt foot has some general ankle pain when exercising but getting pain in her plantar fascia  There is a lump she can feel  It is tender   She got an XR which was normal        The following portions of the patient's history were reviewed and updated as appropriate: allergies, current medications, past family history, past medical history, past social history, past surgical history and problem list     Review of Systems    Constitutional: Negative  HENT: Negative for sinus pressure and sinus pain  Respiratory: Negative for cough and shortness of breath  Cardiovascular: Negative for chest pain and leg swelling  Gastrointestinal: Negative for diarrhea, nausea and vomiting  Musculoskeletal: Concerns with both feet  Skin: Negative for rash or wound  Neurological: Negative for weakness, numbness and headaches  Psychiatric/Behavioral: The patient is not nervous/anxious  Objective: There were no vitals taken for this visit  Physical Exam      Vitals reviewed  Constitutional:       Appearance: Patient is not ill-appearing or diaphoretic  Patient is healthy weight  HENT:      Nose: No congestion or rhinorrhea  Cardiovascular:        Dorsalis pedis pulses are 2+ on the right side and 2+ on the left side  Posterior tibial pulses are 2+ on the right side and 2+ on the left side  Pulmonary:      Effort: Pulmonary effort is normal  No respiratory distress  Musculoskeletal:      Right lower leg: No edema  Left lower leg: No edema  No calf pain with compression           Right foot:      Normal range of motion to ankle, STJ, midfoot  Deformity: adductovarus 5th toe deformity  Pain: positive drawer with tenderness to the ATFL  NOrmal peroneal function  NO medial ankle ligament pain  MMT at ankle is normal  [place] Daphne test is normal     Strength (MMT)   Achilles 5/5   PT  5/5   Peroneal 5/5   TA  5/5   EHL/EDL 5/5     Left foot:      Normal range of motion to ankle, STJ, midfoot  Deformity: none     Pain: small mass in medial plantar fascia just proximal to sesamoid apparatus  No pain on palpation  Strength (MMT)   Achilles 5/5   PT  5/5   Peroneal 5/5   TA  5/5   EHL/EDL 5/5  Skin:     Capillary Refill: Capillary refill takes less than 2 seconds  Findings: No bruising, erythema, lesion or rash  Neurological:      Mental Status: Alert and oriented to person, place, and time  Gait: Gait normal        Right Protective Sensation: 10 sites tested  10 sites sensed  Left  Protective Sensation: 10 sites tested  10 sites sensed  Psychiatric:         Mood and Affect: Mood normal            XRay 3 views of the right ankle personally read by Dr Arthur Darden in office today and discussed with patient:  1  No evidence of fracture  2  Mild midfoot degenerative changes  3  Ankle mortise is anatomic  4  No acute findings    XRay 3 views of the left foot personally read by Dr Arthur Darden in office today and discussed with patient:  5   No acute findings

## 2022-12-29 NOTE — PATIENT INSTRUCTIONS
Ankle Exercises   AMBULATORY CARE:   What you need to know about ankle exercises: Ankle exercises help strengthen your ankle and improve its function after injury  These are beginning exercises  Ask your healthcare provider if you need to see a physical therapist for more advanced exercises  Do these exercises 3 to 5 days a week , or as directed by your healthcare provider  Ask if you should perform the exercises on each ankle  Do the exercises in the order that your healthcare provider recommends  This will help prevent swelling, chronic pain, and reinjury  Start with range of motion exercises  Then progress to strengthening exercises, and finally to balancing exercises  Warm up before you do ankle exercises  Walk or ride a stationary bike for 5 to 10 minutes to prepare your ankle for movement  Stop if you feel pain  It is normal to feel some discomfort at first  Regular exercise will help decrease your discomfort over time  How to perform range of motion exercises safely:  Begin with range of motion exercises to improve flexibility  Ask your healthcare provider when you can progress to strengthening exercises  Ankle alphabet:  Sit on a chair so that your feet do not touch the floor  Use your big toe to write each letter of the alphabet  Use only your foot and ankle, and keep your movements small  Do 2 sets  Calf stretches:      Sitting calf stretches with a towel:  Sit on the floor with both legs out straight in front of you  Loop a towel around the ball of your injured foot  Grasp the ends of the towel and pull it toward you  Keep your leg and back straight  Do not lean forward as you pull the towel  Hold for 30 seconds  Then relax for 30 seconds  Do 2 sets of 10  Standing calf stretches:  Stand facing a wall with the foot that is not injured forward and your knee slightly bent  Keep the leg with the injured foot straight and behind you with your toes pointed in slightly   With both heels flat on the floor, press your hips forward  Do not arch your back  Hold for 30 seconds, and then relax for 30 seconds  Do 2 sets of 10  Repeat with your leg bent  Do 2 sets of 10  How to perform strengthening exercises safely:  After you can perform range of motion exercises without pain, you may begin strengthening exercises  Ask your healthcare provider when you can progress to balancing exercises  Ankle movement in 4 directions:  Sit on the floor with your legs straight in front of you  Keep your heels on the floor for support  Dorsiflexion:  Begin with your toes pointing straight up  Pull your toes toward your body  Slowly return to the starting position  Do 3 sets of 5  Plantar flexion:  Begin with your toes pointing straight up  Push your toes away from your body  Slowly return to the starting position  Do 3 sets of 5  Inversion:  Begin with your toes pointing straight up  Push your toes inward, toward each other  Slowly return to the starting position  Do 3 sets of 5  Eversion:  Begin with your toes pointing straight up  Push your toes outward, away from each other  Slowly return to the starting position  Do 3 sets of 5  Toe curls with a towel:  Sit on a chair so that both of your feet are flat on the floor  Place a small towel on the floor in front of your injured foot  Grab the center of the towel with your toes and curl the towel toward you  Relax and repeat  Do 1 set of 5  [place] pick-ups:  Sit on a chair so that both of your feet are flat on the floor  Place 20 marbles on the floor in front of your injured foot  Use your toes to  one marble at a time and place it into a bowl  Repeat until you have picked up all the marbles  Do 1 set  Heel raises:      Single leg heel raises:  Stand with your weight evenly on both feet  Hold on to a chair or a wall for balance   Lift the foot that is not injured off the floor so all your weight is placed on your injured foot  Raise the heel of your injured foot as high as you can  Slowly lower your heel to the floor  Do 1 set of 10  Double leg heel raises:  Stand with your weight evenly on both feet  Hold on to a chair or a wall for balance  Raise both of your heels as high as you can  Slowly lower your heels to the floor  Do 1 set of 10  Heel and toe walks:      Heel walks:  Begin in a standing position  Lift your toes off the floor and walk on your heels  Keep your toes lifted as high as possible  Do 2 sets of 10  Toe walks:  Begin in a standing position  Lift your heels off the floor and walk on the balls and toes of your feet  Keep your heels lifted as high as possible  Do 2 sets of 10  How to perform a balance exercise safely:  After you can perform strengthening exercises without pain, you may do this beginning balancing exercise  Ask your healthcare provider for more advanced balance exercises  Single leg stance:  Stand with your weight evenly on both feet, or hold on to a chair or a wall  Do not lean to the side  Lift the foot that is not injured off the floor so all your weight is placed on your injured foot  Balance on your injured foot  Ask your healthcare provider how long to hold this position  Contact your healthcare provider if:   Your pain becomes worse  You have new pain  You have questions or concerns about your condition, care, or exercise program     © Copyright [employer] 2022 Information is for End User's use only and may not be sold, redistributed or otherwise used for commercial purposes  All illustrations and images included in CareNotes® are the copyrighted property of A D A M , Inc  or [facility] [employer]   The above information is an  only  It is not intended as medical advice for individual conditions or treatments   Talk to your doctor, nurse or pharmacist before following any medical regimen to see if it is safe and effective for you

## 2023-01-04 ENCOUNTER — EVALUATION (OUTPATIENT)
Dept: PHYSICAL THERAPY | Facility: CLINIC | Age: 58
End: 2023-01-04

## 2023-01-04 DIAGNOSIS — S93.401A MODERATE RIGHT ANKLE SPRAIN, INITIAL ENCOUNTER: ICD-10-CM

## 2023-01-04 DIAGNOSIS — G89.29 CHRONIC PAIN OF LEFT ANKLE: Primary | ICD-10-CM

## 2023-01-04 DIAGNOSIS — M54.16 LUMBAR RADICULOPATHY: ICD-10-CM

## 2023-01-04 DIAGNOSIS — M25.572 CHRONIC PAIN OF LEFT ANKLE: Primary | ICD-10-CM

## 2023-01-04 NOTE — PROGRESS NOTES
PT Evaluation     Today's date: 2023  Patient name: Nikky Marie  : 1965  MRN: [id number]  Referring provider: Griselda Blue, D*  Dx:   Encounter Diagnosis     ICD-10-CM    1  Chronic pain of left ankle  M25 572     G89 29       2  Moderate right ankle sprain, initial encounter  S93 401A Ambulatory referral to Physical Therapy      3  Lumbar radiculopathy  M54 16                      Assessment  Assessment details: Pt presents with s/s R ATFL chronic sprain, L post tib tendonopathy and lumbar radiculopathy with an extension DP  Pt will benefit from PT to address impairments and will be treated for her L ankle and LBP through direct access  Impairments: activity intolerance, impaired physical strength, lacks appropriate home exercise program, pain with function, poor posture  and poor body mechanics    Goals  ST-4 weeks  1   Pt will decrease pain > 50%  2   Pt will centralize LE sx  LT-8 weeks  1   Pt will decrease pain > 75%  2   Pt will play [employer] without pain  Plan  Planned therapy interventions: manual therapy, neuromuscular re-education, postural training, patient education, self care, strengthening, stretching, therapeutic activities, therapeutic exercise, therapeutic training, flexibility, functional ROM exercises, gait training and home exercise program  Frequency: 2x week  Duration in weeks: 6        Subjective Evaluation    History of Present Illness  Mechanism of injury: Pt is a 65 yof c/o R ankle pain after tripping while playing [employer] in   Pt reports chronic medial L ankle pain that began 35 yrs ago after falling  Pt reports LBP with L radiating pain down her leg that began 35 years ago and has bothered her on and off      Pain  Current pain rating: 3  At best pain rating: 3  At worst pain ratin  Quality: dull ache  Relieving factors: rest and change in position  Progression: no change      Diagnostic Tests  X-ray: abnormal (22 R mild talonavicular OA, small retrocalceal spur, 12/12/22 L calcaneal spur )  Patient Goals  Patient goals for therapy: decreased pain, improved balance, increased motion, increased strength, independence with ADLs/IADLs and return to sport/leisure activities          Objective     Postural Observations  Seated posture: poor  Standing posture: poor  Correction of posture: makes symptoms better        Active Range of Motion     Lumbar   Flexion:  WFL  Extension:  Restriction level: minimal  Left lateral flexion:  WFL  Right lateral flexion:  with pain Restriction level: minimal  Left rotation:  WFL  Right rotation:  with pain Restriction level: minimal    Passive Range of Motion   Left Ankle/Foot    Dorsiflexion (ke): 10 degrees   Plantar flexion: 70 degrees   Inversion: 42 degrees   Eversion: 10 degrees   Great toe extension: 80 degrees     Right Ankle/Foot    Dorsiflexion (ke): 20 degrees   Plantar flexion: 70 degrees   Inversion: 45 degrees   Eversion: 10 degrees   Great toe extension: 80 degrees     Joint Play     Pain: L5   Mechanical Assessment    Cervical      Thoracic      Lumbar    Standing extension: repeated movements  Pain location: centralized  Pain intensity: better  Lying extension: repeated movements  Pain location: centralized  Pain intensity: better  Pain level: abolished    Strength/Myotome Testing     Left Ankle/Foot   Dorsiflexion: 4-  Plantar flexion: 3+  Inversion: 4-  Eversion: 4-    Right Ankle/Foot   Dorsiflexion: 4-  Plantar flexion: 4-  Inversion: 4-  Eversion: 4-    Additional Strength Details  DL Heel raises: 5*  Balance: SLS: 60 sec  Gait: mod medial heelwhip  Mod B calcaneal varus  Tests   Left Ankle/Foot   Negative for anterior drawer  Right Ankle/Foot   Positive for anterior drawer and inversion talar tilt  Dx: R chronic ATFL sprain, L post tib tenonopathy: DA 1/4/23, L5/S1 ext DP: DA 1/4/23      Manuals 1/4            R ankle DF MWM  2x10           R ankle DF MWM taping 3 min                                     Neuro Re-Ed             Posture education/correction 5 min            Standing lumbar ext 1x10            Prone press-ups 1x10            bridges             SLS                                       Ther Ex             DL ecc heelraises 1x10            Standing GA stretch 15"x2 ea            TB GAUTAM R             TB INV L             Standing towel scrunches 1x30 ea                                                   Ther Activity                                       Gait Training                                       Modalities

## 2023-01-12 ENCOUNTER — OFFICE VISIT (OUTPATIENT)
Dept: PHYSICAL THERAPY | Facility: CLINIC | Age: 58
End: 2023-01-12

## 2023-01-12 DIAGNOSIS — S93.401A MODERATE RIGHT ANKLE SPRAIN, INITIAL ENCOUNTER: Primary | ICD-10-CM

## 2023-01-12 DIAGNOSIS — G89.29 CHRONIC PAIN OF LEFT ANKLE: ICD-10-CM

## 2023-01-12 DIAGNOSIS — M54.16 LUMBAR RADICULOPATHY: ICD-10-CM

## 2023-01-12 DIAGNOSIS — M25.572 CHRONIC PAIN OF LEFT ANKLE: ICD-10-CM

## 2023-01-12 NOTE — PROGRESS NOTES
Daily Note     Today's date: 2023  Patient name: Severo Ford  : 1965  MRN: [id number]  Referring provider: TABATHA Alegre  Dx:   Encounter Diagnosis     ICD-10-CM    1  Moderate right ankle sprain, initial encounter  S93 401A       2  Chronic pain of left ankle  M25 572     G89 29       3  Lumbar radiculopathy  M54 16                      Subjective: Pt reports 1/10 R ankle pain, 5/10 L medial ankle and plantar foot pain with DL heelraises and with playing [place]  Objective: See treatment diary below    Assessment: L arch taping resolved pain with heelraises  Modified HEP to include TB PF, INV, GAUTAM  Plan: Cont POC  Assess response to tape nv  Dx: R chronic ATFL sprain, L post tib tenonopathy: DA 23, L5/S1 ext DP: DA 23      Manuals            R ankle DF MWM  2x10           R ankle INV MWM  1x10           R ankle DF MWM taping  3 min           L arch taping  3 min           Graston L plantar fasia  nv           Neuro Re-Ed             Posture education/correction 5 min            Standing lumbar ext 1x10 1x10           Prone press-ups 1x10 1x10           bridges             SLS                                       Ther Ex             DL ecc heelraises 1x10 1x10*           TB PF  G/ 3x10 ea           Standing GA stretch 15"x2 ea 15"x3 ea           TB GAUTAM R  G/ 3x10            TB INV L  G/ 3x10           Seated towel scrunches 1x30 ea 1x50 ea                                                  Ther Activity                                       Gait Training                                       Modalities

## 2023-01-19 ENCOUNTER — OFFICE VISIT (OUTPATIENT)
Dept: PHYSICAL THERAPY | Facility: CLINIC | Age: 58
End: 2023-01-19

## 2023-01-19 DIAGNOSIS — S93.401A MODERATE RIGHT ANKLE SPRAIN, INITIAL ENCOUNTER: Primary | ICD-10-CM

## 2023-01-19 DIAGNOSIS — M25.572 CHRONIC PAIN OF LEFT ANKLE: ICD-10-CM

## 2023-01-19 DIAGNOSIS — G89.29 CHRONIC PAIN OF LEFT ANKLE: ICD-10-CM

## 2023-01-19 DIAGNOSIS — M54.16 LUMBAR RADICULOPATHY: ICD-10-CM

## 2023-01-19 NOTE — PROGRESS NOTES
Daily Note     Today's date: 2023  Patient name: Ric Asencio  : 1965  MRN: [id number]  Referring provider: TABATHA Mckeon  Dx:   Encounter Diagnosis     ICD-10-CM    1  Moderate right ankle sprain, initial encounter  S93 401A       2  Chronic pain of left ankle  M25 572     G89 29       3  Lumbar radiculopathy  M54 16                      Subjective: Pt reports at this time she is currently doing well with her exercises and noticed a big improvement in the last few days  She is noting good improvement after taping last session  Objective: See treatment diary below      Assessment: Pt did not report any increase in pain after any treatment and noted improvement with taping again more on the L foot compared to R  Required cueing during REIL to ensure hips did not elevate and movement was more in the lumbar spine  Plan: Continue per plan of care  Dx: R chronic ATFL sprain, L post tib tenonopathy: DA 23, L5/S1 ext DP: DA 23      Manuals           R ankle DF MWM  2x10 MK          R ankle INV MWM  1x10 MK          R ankle DF MWM taping  3 min MK          L arch taping  3 min MK          Graston L plantar fasia  nv NV          Neuro Re-Ed             Posture education/correction 5 min            Standing lumbar ext 1x10 1x10 2x10          Prone press-ups 1x10 1x10 10x           bridges             SLS                                       Ther Ex             DL ecc heelraises 1x10 1x10* 2x10          TB PF  G/ 3x10 ea G/ 3x10  B          Standing GA stretch 15"x2 ea 15"x3 ea 20"x3          TB GAUTAM R  G/ 3x10  G/ 3x10           TB INV L  G/ 3x10 G/ 3x10           Seated towel scrunches 1x30 ea 1x50 ea 1x50 ea                                                 Ther Activity                                       Gait Training                                       Modalities

## 2023-01-26 ENCOUNTER — OFFICE VISIT (OUTPATIENT)
Dept: PHYSICAL THERAPY | Facility: CLINIC | Age: 58
End: 2023-01-26

## 2023-01-26 DIAGNOSIS — G89.29 CHRONIC PAIN OF LEFT ANKLE: ICD-10-CM

## 2023-01-26 DIAGNOSIS — M54.16 LUMBAR RADICULOPATHY: ICD-10-CM

## 2023-01-26 DIAGNOSIS — M25.572 CHRONIC PAIN OF LEFT ANKLE: ICD-10-CM

## 2023-01-26 DIAGNOSIS — S93.401A MODERATE RIGHT ANKLE SPRAIN, INITIAL ENCOUNTER: Primary | ICD-10-CM

## 2023-01-26 NOTE — PROGRESS NOTES
Daily Note     Today's date: 2023  Patient name: Jen Becerra  : 1965  MRN: [id number]  Referring provider: TABATHA Kim  Dx:   Encounter Diagnosis     ICD-10-CM    1  Moderate right ankle sprain, initial encounter  S93 401A       2  Chronic pain of left ankle  M25 572     G89 29       3  Lumbar radiculopathy  M54 16                      Subjective: patient reports she tried some yoga which went okay  She mentions she is having some left sided low back pain today and the ankles are okay  Objective: See treatment diary below      Assessment: Tolerated treatment well  She reports improvements in LBP after standing and prone repeated extensions  Added graston to L plantar fascia today with good tolerance and improvements noted afterward  She would benefit from continued PT  Plan: Continue per plan of care  Dx: R chronic ATFL sprain, L post tib tenonopathy: DA 23, L5/S1 ext DP: DA 23      Manuals          R ankle DF MWM  2x10 MK SK         R ankle INV MWM  1x10 MK SK         R ankle DF MWM taping  3 min  SK         L arch taping  3 min  SK         Graston L plantar fasia  nv NV SK         Neuro Re-Ed             Posture education/correction 5 min            Standing lumbar ext 1x10 1x10 2x10 2x10         Prone press-ups 1x10 1x10 10x  2x10         bridges             SLS                                       Ther Ex             DL ecc heelraises 1x10 1x10* 2x10 2x10         TB PF  G/ 3x10 ea G/ 3x10  B G/ 3x10  B         Standing GA stretch 15"x2 ea 15"x3 ea 20"x3 20"x3 ea         TB GAUTAM R  G/ 3x10  G/ 3x10  G/ 3x10          TB INV L  G/ 3x10 G/ 3x10  G/ 3x10          Seated towel scrunches 1x30 ea 1x50 ea 1x50 ea 1x50 ea                                                Ther Activity                                       Gait Training                                       Modalities

## 2023-02-02 ENCOUNTER — OFFICE VISIT (OUTPATIENT)
Dept: PHYSICAL THERAPY | Facility: CLINIC | Age: 58
End: 2023-02-02

## 2023-02-02 DIAGNOSIS — M54.16 LUMBAR RADICULOPATHY: ICD-10-CM

## 2023-02-02 DIAGNOSIS — M25.572 CHRONIC PAIN OF LEFT ANKLE: ICD-10-CM

## 2023-02-02 DIAGNOSIS — G89.29 CHRONIC PAIN OF LEFT ANKLE: ICD-10-CM

## 2023-02-02 DIAGNOSIS — S93.401A MODERATE RIGHT ANKLE SPRAIN, INITIAL ENCOUNTER: Primary | ICD-10-CM

## 2023-02-02 NOTE — PROGRESS NOTES
Daily Note     Today's date: 2023  Patient name: Dhruv Bernardo  : 1965  MRN: [id number]  Referring provider: TABATHA Barreto  Dx:   Encounter Diagnosis     ICD-10-CM    1  Moderate right ankle sprain, initial encounter  S93 401A       2  Chronic pain of left ankle  M25 572     G89 29       3  Lumbar radiculopathy  M54 16                      Subjective: Pt reports 3/10 low back pain with left leg symptoms into her lateral ankle pre tx  Objective: See treatment diary below      Assessment: Pt demonstrated abolished LE symptoms and centralization following prone press ups with pt OP  Instructed pt to perform this exercise 4x/day  Pt is able to progress ankle strengthening to point of fatigue  Plan: Continue per plan of care  Dx: R chronic ATFL sprain, L post tib tenonopathy: DA 23, L5/S1 ext DP: DA 23      Manuals  2/2        R ankle DF MWM  2x10 MK SK         R ankle INV MWM  1x10 MK SK         R ankle DF MWM taping  3 min MK SK 3 min EK        L arch taping  3 min MK SK 3 min EK        Graston L plantar fasia  nv NV SK 5 min        Neuro Re-Ed             Posture education/correction 5 min            Standing lumbar ext 1x10 1x10 2x10 2x10 3x10        Prone press-ups 1x10 1x10 10x  2x10 3x10  1x10 w/ pt OP        bridges             SLS                                       Ther Ex             DL ecc heelraises 1x10 1x10* 2x10 2x10 3x12        TB PF  G/ 3x10 ea G/ 3x10  B G/ 3x10  B G/ 3x10  B        Standing GA stretch 15"x2 ea 15"x3 ea 20"x3 20"x3 ea 20"x3 ea        TB GAUTAM R  G/ 3x10  G/ 3x10  G/ 3x10  G/  3x12        TB INV L  G/ 3x10 G/ 3x10  G/ 3x10  G/  3x12        Seated towel scrunches 1x30 ea 1x50 ea 1x50 ea 1x50 ea 1x50 ea standing                                               Ther Activity                                       Gait Training                                       Modalities

## 2023-02-09 ENCOUNTER — OFFICE VISIT (OUTPATIENT)
Dept: PHYSICAL THERAPY | Facility: CLINIC | Age: 58
End: 2023-02-09

## 2023-02-09 DIAGNOSIS — M54.16 LUMBAR RADICULOPATHY: ICD-10-CM

## 2023-02-09 DIAGNOSIS — M25.572 CHRONIC PAIN OF LEFT ANKLE: ICD-10-CM

## 2023-02-09 DIAGNOSIS — S93.401A MODERATE RIGHT ANKLE SPRAIN, INITIAL ENCOUNTER: Primary | ICD-10-CM

## 2023-02-09 DIAGNOSIS — G89.29 CHRONIC PAIN OF LEFT ANKLE: ICD-10-CM

## 2023-02-09 NOTE — PROGRESS NOTES
Daily Note     Today's date: 2023  Patient name: Man Renteria  : 1965  MRN: [id number]  Referring provider: TABATHA Ladd  Dx:   Encounter Diagnosis     ICD-10-CM    1  Moderate right ankle sprain, initial encounter  S93 401A       2  Chronic pain of left ankle  M25 572     G89 29       3  Lumbar radiculopathy  M54 16                      Subjective: Pt reports L LE have been mostly in her hip now, mild discomfort in B feet for a day after light badminton  Objective: See treatment diary below    Assessment: Pt demonstrated improved ankle stability, and is centralizing  Plan: Continue per plan of care  Dx: R chronic ATFL sprain, L post tib tenonopathy: DA 23, L5/S1 ext DP: DA 23      Manuals        R ankle DF MWM  2x10 MK SK         R ankle INV MWM  1x10 MK SK         R ankle DF MWM taping  3 min MK SK 3 min EK 3 min       L arch taping  3 min MK SK 3 min EK 3 min       Graston L plantar fasia  nv NV SK 5 min 4 min       Neuro Re-Ed             Posture education/correction 5 min            Standing lumbar ext 1x10 1x10 2x10 2x10 3x10 3x10       Prone press-ups 1x10 1x10 10x  2x10 3x10  1x10 w/ pt OP 3x10       bridges             SLS      1x60" ea       SLS foam      1x60" ea       SLS EC      1x60" ea       Ther Ex             DL ecc heelraises 1x10 1x10* 2x10 2x10 3x12 3x12       TB PF  G/ 3x10 ea G/ 3x10  B G/ 3x10  B G/ 3x10  B Bruce/ 3x10 B       Standing GA stretch 15"x2 ea 15"x3 ea 20"x3 20"x3 ea 20"x3 ea 15"x3       TB GAUTAM R  G/ 3x10  G/ 3x10  G/ 3x10  G/  3x12 Bruce/ 3x10       TB INV L  G/ 3x10 G/ 3x10  G/ 3x10  G/  3x12 Bruce/ 3x10       standing towel scrunches 1x30 ea 1x50 ea 1x50 ea 1x50 ea 1x50 ea standing 1x100 ea                                              Ther Activity                                       Gait Training                                       Modalities

## 2023-02-16 ENCOUNTER — APPOINTMENT (OUTPATIENT)
Dept: PHYSICAL THERAPY | Facility: CLINIC | Age: 58
End: 2023-02-16

## 2023-02-17 ENCOUNTER — APPOINTMENT (OUTPATIENT)
Dept: PHYSICAL THERAPY | Facility: CLINIC | Age: 58
End: 2023-02-17

## 2023-02-23 ENCOUNTER — OFFICE VISIT (OUTPATIENT)
Dept: PHYSICAL THERAPY | Facility: CLINIC | Age: 58
End: 2023-02-23

## 2023-02-23 DIAGNOSIS — M25.572 CHRONIC PAIN OF LEFT ANKLE: ICD-10-CM

## 2023-02-23 DIAGNOSIS — G89.29 CHRONIC PAIN OF LEFT ANKLE: ICD-10-CM

## 2023-02-23 DIAGNOSIS — M54.16 LUMBAR RADICULOPATHY: ICD-10-CM

## 2023-02-23 DIAGNOSIS — S93.401A MODERATE RIGHT ANKLE SPRAIN, INITIAL ENCOUNTER: Primary | ICD-10-CM

## 2023-02-23 NOTE — PROGRESS NOTES
Daily Note     Today's date: 2023  Patient name: Nataly Muse  : 1965  MRN: [id number]  Referring provider: TABATHA Daniels  Dx:   Encounter Diagnosis     ICD-10-CM    1  Moderate right ankle sprain, initial encounter  S93 401A       2  Chronic pain of left ankle  M25 572     G89 29       3  Lumbar radiculopathy  M54 16                      Subjective: Pt reports her ankles are feeling better and reports only mild discomfort with light volleying during badmitton  Objective: See treatment diary below    Assessment: Pt demonstrated improved ankle stability  Plan: Cont POC  Dx: R chronic ATFL sprain, L post tib tenonopathy: DA 23, L5/S1 ext DP: DA 23      Manuals       R ankle DF MWM  2x10 MK SK         R ankle INV MWM  1x10 MK SK         R ankle DF MWM taping  3 min MK SK 3 min EK 3 min 3 min      L arch taping  3 min MK SK 3 min EK 3 min 3 min      Graston L plantar fasia  nv NV SK 5 min 4 min 4 min      Neuro Re-Ed             Posture education/correction 5 min            Standing lumbar ext 1x10 1x10 2x10 2x10 3x10 3x10 3x10      Prone press-ups 1x10 1x10 10x  2x10 3x10  1x10 w/ pt OP 3x10 3x10      bridges       3x15      SLS      1x60" ea 1x60" ea      SLS foam      1x60" ea 1x60" ea      SLS EC      1x60" ea       Ther Ex             DL ecc heelraises 1x10 1x10* 2x10 2x10 3x12 3x12 Step 3x10      TB PF  G/ 3x10 ea G/ 3x10  B G/ 3x10  B G/ 3x10  B Bruce/ 3x10 B       Standing GA stretch 15"x2 ea 15"x3 ea 20"x3 20"x3 ea 20"x3 ea 15"x3 15"x3      TB GAUTAM R  G/ 3x10  G/ 3x10  G/ 3x10  G/  3x12 Bruce/ 3x10       TB INV L  G/ 3x10 G/ 3x10  G/ 3x10  G/  3x12 Bruce/ 3x10       standing towel scrunches 1x30 ea 1x50 ea 1x50 ea 1x50 ea 1x50 ea standing 1x100 ea                                              Ther Activity                                       Gait Training                                       Modalities

## 2023-03-02 ENCOUNTER — OFFICE VISIT (OUTPATIENT)
Dept: PHYSICAL THERAPY | Facility: CLINIC | Age: 58
End: 2023-03-02

## 2023-03-02 DIAGNOSIS — G89.29 CHRONIC PAIN OF LEFT ANKLE: ICD-10-CM

## 2023-03-02 DIAGNOSIS — M25.572 CHRONIC PAIN OF LEFT ANKLE: ICD-10-CM

## 2023-03-02 DIAGNOSIS — M54.16 LUMBAR RADICULOPATHY: ICD-10-CM

## 2023-03-02 DIAGNOSIS — S93.401A MODERATE RIGHT ANKLE SPRAIN, INITIAL ENCOUNTER: Primary | ICD-10-CM

## 2023-03-06 ENCOUNTER — VBI (OUTPATIENT)
Dept: ADMINISTRATIVE | Facility: OTHER | Age: 58
End: 2023-03-06

## 2023-03-09 ENCOUNTER — OFFICE VISIT (OUTPATIENT)
Dept: PHYSICAL THERAPY | Facility: CLINIC | Age: 58
End: 2023-03-09

## 2023-03-09 DIAGNOSIS — M25.572 CHRONIC PAIN OF LEFT ANKLE: ICD-10-CM

## 2023-03-09 DIAGNOSIS — M54.16 LUMBAR RADICULOPATHY: ICD-10-CM

## 2023-03-09 DIAGNOSIS — S93.401A MODERATE RIGHT ANKLE SPRAIN, INITIAL ENCOUNTER: Primary | ICD-10-CM

## 2023-03-09 DIAGNOSIS — G89.29 CHRONIC PAIN OF LEFT ANKLE: ICD-10-CM

## 2023-03-09 NOTE — PROGRESS NOTES
Daily Note     Today's date: 3/9/2023  Patient name: Germain Hyman  : 1965  MRN: [id number]  Referring provider: TABATHA Saba  Dx:   Encounter Diagnosis     ICD-10-CM    1  Moderate right ankle sprain, initial encounter  S93 401A       2  Chronic pain of left ankle  M25 572     G89 29       3  Lumbar radiculopathy  M54 16           Start Time: 0900  Stop Time: 0945  Total time in clinic (min): 45 minutes    Subjective: Patient reports minimal low back pain the last few days  No complaints of pain or radicular symptoms prior to therapy  Objective: See treatment diary below    Assessment: Patient had minimal L fascial restriction noted during IASTM  Good form demonstrated t/o therapy with little no cueing required  Plan: DC as per patient and Kraig Briscoe PT  Dx: R chronic ATFL sprain, L post tib tenonopathy: DA 23, L5/S1 ext DP: DA 23      Manuals 1/4 1/12 1/19 1/26 2/2 2/9 2/23 3/2 3/9    R ankle DF MWM  2x10 MK SK         R ankle INV MWM  1x10 MK SK         R ankle DF MWM taping  3 min MK SK 3 min EK 3 min 3 min      L arch taping  3 min MK SK 3 min EK 3 min 3 min      Graston L plantar fasia  nv NV SK 5 min 4 min 4 min 8 min 8 min    Neuro Re-Ed             Posture education/correction 5 min            Standing lumbar ext 1x10 1x10 2x10 2x10 3x10 3x10 3x10 3x10 3x10    Prone press-ups 1x10 1x10 10x  2x10 3x10  1x10 w/ pt OP 3x10 3x10 3x10 3x10    bridges       3x15 3x15 3x15    SLS      1x60" ea 1x60" ea 1x60" ea 1x60" ea    SLS foam      1x60" ea 1x60" ea 1x60" ea 1x60" ea    SLS EC      1x60" ea       Ther Ex             DL ecc heelraises 1x10 1x10* 2x10 2x10 3x12 3x12 Step 3x10 Step 3x15 Step 3x15    TB PF  G/ 3x10 ea G/ 3x10  B G/ 3x10  B G/ 3x10  B Bruce/ 3x10 B       Standing GA stretch 15"x2 ea 15"x3 ea 20"x3 20"x3 ea 20"x3 ea 15"x3 15"x3 15"x3 15"x3    TB GAUTAM R  G/ 3x10  G/ 3x10  G/ 3x10  G/  3x12 Bruce/ 3x10       TB INV L  G/ 3x10 G/ 3x10  G/ 3x10  G/  3x12 Bruce/ 3x10 standing towel scrunches 1x30 ea 1x50 ea 1x50 ea 1x50 ea 1x50 ea standing 1x100 ea                                              Ther Activity                                       Gait Training                                       Modalities

## 2023-03-10 ENCOUNTER — OFFICE VISIT (OUTPATIENT)
Dept: INTERNAL MEDICINE CLINIC | Facility: CLINIC | Age: 58
End: 2023-03-10

## 2023-03-10 ENCOUNTER — APPOINTMENT (OUTPATIENT)
Dept: LAB | Facility: CLINIC | Age: 58
End: 2023-03-10

## 2023-03-10 VITALS
WEIGHT: 142.4 LBS | OXYGEN SATURATION: 97 % | SYSTOLIC BLOOD PRESSURE: 110 MMHG | RESPIRATION RATE: 16 BRPM | DIASTOLIC BLOOD PRESSURE: 80 MMHG | BODY MASS INDEX: 25.23 KG/M2 | HEART RATE: 70 BPM | TEMPERATURE: 97.2 F | HEIGHT: 63 IN

## 2023-03-10 DIAGNOSIS — Z23 ENCOUNTER FOR IMMUNIZATION: ICD-10-CM

## 2023-03-10 DIAGNOSIS — Z00.00 LABORATORY EXAM ORDERED AS PART OF ROUTINE GENERAL MEDICAL EXAMINATION: ICD-10-CM

## 2023-03-10 DIAGNOSIS — R06.83 SNORING: ICD-10-CM

## 2023-03-10 DIAGNOSIS — Z13.220 SCREENING, LIPID: ICD-10-CM

## 2023-03-10 DIAGNOSIS — R07.0 THROAT PAIN: ICD-10-CM

## 2023-03-10 DIAGNOSIS — Z00.00 ANNUAL PHYSICAL EXAM: Primary | ICD-10-CM

## 2023-03-10 DIAGNOSIS — Z12.31 BREAST CANCER SCREENING BY MAMMOGRAM: ICD-10-CM

## 2023-03-10 DIAGNOSIS — E78.1 HYPERTRIGLYCERIDEMIA: ICD-10-CM

## 2023-03-10 DIAGNOSIS — Z12.4 SCREENING FOR CERVICAL CANCER: ICD-10-CM

## 2023-03-10 DIAGNOSIS — Z13.1 SCREENING FOR DIABETES MELLITUS: ICD-10-CM

## 2023-03-10 LAB
ALBUMIN SERPL BCP-MCNC: 4.5 G/DL (ref 3.5–5)
ALP SERPL-CCNC: 60 U/L (ref 34–104)
ALT SERPL W P-5'-P-CCNC: 21 U/L (ref 7–52)
ANION GAP SERPL CALCULATED.3IONS-SCNC: 6 MMOL/L (ref 4–13)
AST SERPL W P-5'-P-CCNC: 19 U/L (ref 13–39)
BASOPHILS # BLD AUTO: 0.04 THOUSANDS/ÂΜL (ref 0–0.1)
BASOPHILS NFR BLD AUTO: 1 % (ref 0–1)
BILIRUB SERPL-MCNC: 0.53 MG/DL (ref 0.2–1)
BUN SERPL-MCNC: 17 MG/DL (ref 5–25)
CALCIUM SERPL-MCNC: 9.3 MG/DL (ref 8.4–10.2)
CHLORIDE SERPL-SCNC: 105 MMOL/L (ref 96–108)
CHOLEST SERPL-MCNC: 221 MG/DL
CO2 SERPL-SCNC: 28 MMOL/L (ref 21–32)
CREAT SERPL-MCNC: 0.83 MG/DL (ref 0.6–1.3)
EOSINOPHIL # BLD AUTO: 0.04 THOUSAND/ÂΜL (ref 0–0.61)
EOSINOPHIL NFR BLD AUTO: 1 % (ref 0–6)
ERYTHROCYTE [DISTWIDTH] IN BLOOD BY AUTOMATED COUNT: 12.1 % (ref 11.6–15.1)
GFR SERPL CREATININE-BSD FRML MDRD: 79 ML/MIN/1.73SQ M
GLUCOSE P FAST SERPL-MCNC: 108 MG/DL (ref 65–99)
HCT VFR BLD AUTO: 39.1 % (ref 34.8–46.1)
HDLC SERPL-MCNC: 46 MG/DL
HGB BLD-MCNC: 13.1 G/DL (ref 11.5–15.4)
IMM GRANULOCYTES # BLD AUTO: 0.01 THOUSAND/UL (ref 0–0.2)
IMM GRANULOCYTES NFR BLD AUTO: 0 % (ref 0–2)
LDLC SERPL CALC-MCNC: 124 MG/DL (ref 0–100)
LYMPHOCYTES # BLD AUTO: 1.94 THOUSANDS/ÂΜL (ref 0.6–4.47)
LYMPHOCYTES NFR BLD AUTO: 38 % (ref 14–44)
MCH RBC QN AUTO: 31.6 PG (ref 26.8–34.3)
MCHC RBC AUTO-ENTMCNC: 33.5 G/DL (ref 31.4–37.4)
MCV RBC AUTO: 94 FL (ref 82–98)
MONOCYTES # BLD AUTO: 0.36 THOUSAND/ÂΜL (ref 0.17–1.22)
MONOCYTES NFR BLD AUTO: 7 % (ref 4–12)
NEUTROPHILS # BLD AUTO: 2.67 THOUSANDS/ÂΜL (ref 1.85–7.62)
NEUTS SEG NFR BLD AUTO: 53 % (ref 43–75)
NRBC BLD AUTO-RTO: 0 /100 WBCS
PLATELET # BLD AUTO: 163 THOUSANDS/UL (ref 149–390)
PMV BLD AUTO: 11 FL (ref 8.9–12.7)
POTASSIUM SERPL-SCNC: 4.2 MMOL/L (ref 3.5–5.3)
PROT SERPL-MCNC: 7.3 G/DL (ref 6.4–8.4)
RBC # BLD AUTO: 4.15 MILLION/UL (ref 3.81–5.12)
SODIUM SERPL-SCNC: 139 MMOL/L (ref 135–147)
TRIGL SERPL-MCNC: 254 MG/DL
TSH SERPL DL<=0.05 MIU/L-ACNC: 3.59 UIU/ML (ref 0.45–4.5)
WBC # BLD AUTO: 5.06 THOUSAND/UL (ref 4.31–10.16)

## 2023-03-10 NOTE — PROGRESS NOTES
[facility]    NAME: Sunshine Fernandes  AGE: 65 y o  SEX: female  : 1965     DATE: 3/10/2023     Assessment and Plan:     Problem List Items Addressed This Visit    None  Visit Diagnoses     Annual physical exam    -  Primary    Encounter for immunization        Relevant Orders    TDAP VACCINE GREATER THAN OR EQUAL TO 6YO IM    Zoster Vaccine Recombinant IM    Screening, lipid        Relevant Orders    Lipid Panel with Direct LDL reflex    Screening for diabetes mellitus        Relevant Orders    HEMOGLOBIN A1C W/ EAG ESTIMATION    Laboratory exam ordered as part of routine general medical examination        Relevant Orders    CBC and differential    Comprehensive metabolic panel    Hypertriglyceridemia        Relevant Orders    TSH, 3rd generation with Free T4 reflex    Breast cancer screening by mammogram        Relevant Orders    Mammo screening bilateral w 3d & cad    Screening for cervical cancer        Relevant Orders    Ambulatory Referral to Obstetrics / Gynecology    Snoring        Relevant Orders    Home Study    Throat pain        Relevant Orders    Ambulatory Referral to Otolaryngology          Immunizations and preventive care screenings were discussed with patient today  Appropriate education was printed on patient's after visit summary  Counseling:  Exercise: the importance of regular exercise/physical activity was discussed  Recommend exercise 3-5 times per week for at least 30 minutes  BMI Counseling: Body mass index is 25 63 kg/m²  The BMI is above normal  Nutrition recommendations include encouraging healthy choices of fruits and vegetables, moderation in carbohydrate intake and reducing intake of saturated and trans fat  Exercise recommendations include exercising 3-5 times per week  Rationale for BMI follow-up plan is due to patient being overweight or obese       Depression Screening and Follow-up Plan: Patient was screened for depression during today's encounter  They screened negative with a PHQ-2 score of 2  Return in about 1 year (around 3/10/2024), or ALSO SCHEDULE SECOND SHINGRIX IN 2-6MONTHS  Chief Complaint:     Chief Complaint   Patient presents with   • Annual Exam     [employer] form for employment-Will do pending labs today      History of Present Illness:     Adult Annual Physical   Patient here for a comprehensive physical exam  The patient reports problems - throat pain  Diet and Physical Activity  Diet/Nutrition: well balanced diet  Exercise: unable to play intense [employer] anymore due to ankle pain  Depression Screening  PHQ-2/9 Depression Screening    Little interest or pleasure in doing things: 0 - not at all  Feeling down, depressed, or hopeless: 2 - more than half the days  PHQ-2 Score: 2  PHQ-2 Interpretation: Negative depression screen       General Health  Sleep: gets 7-8 hours of sleep on average and unrefreshing sleep  Hearing: normal - bilateral   Vision: goes for regular eye exams  Dental: regular dental visits  /GYN Health  Patient is: postmenopausal     Review of Systems:     Review of Systems   Constitutional: Negative for chills, fever and unexpected weight change  HENT: Positive for sore throat (right side throat pain for 2 weeks; started voice lessons in October) and tinnitus  Negative for hearing loss  +teeth clenching during sleep   Respiratory: Negative for cough and shortness of breath  Cardiovascular: Negative for chest pain and palpitations  Gastrointestinal: Negative for abdominal pain, constipation and diarrhea  Genitourinary: Negative for difficulty urinating  Neurological: Negative for dizziness and headaches        Past Medical History:     Past Medical History:   Diagnosis Date   • Bug bite 6/12/2020   • Rash 6/12/2020   • Varicella       Past Surgical History:     Past Surgical History:   Procedure Laterality Date   •  SECTION     • POLYPECTOMY      Sinus   • [place] HYSTEROSCOPY BX ENDOMETRIUM&/POLYPC W/WO D&C N/A 2021    Procedure: DILATATION AND CURETTAGE (D&C) WITH HYSTEROSCOPY (MYOSURE);   Surgeon: Reji Downing MD;  Location: [facility] MAIN OR;  Service: Gynecology      Social History:     Social History     Socioeconomic History   • Marital status: /Civil Union     Spouse name: None   • Number of children: None   • Years of education: None   • Highest education level: None   Occupational History   • None   Tobacco Use   • Smoking status: Never     Passive exposure: Never   • Smokeless tobacco: Never   Vaping Use   • Vaping Use: Never used   Substance and Sexual Activity   • Alcohol use: Not Currently   • Drug use: No   • Sexual activity: Yes     Partners: Male     Birth control/protection: Post-menopausal   Other Topics Concern   • None   Social History Narrative   • None     Social Determinants of Health     Financial Resource Strain: Not on file   Food Insecurity: Not on file   Transportation Needs: Not on file   Physical Activity: Not on file   Stress: Not on file   Social Connections: Not on file   Intimate Partner Violence: Not on file   Housing Stability: Not on file      Family History:     Family History   Problem Relation Age of Onset   • Hypertension Mother    • Hypertension Father    • No Known Problems Daughter    • No Known Problems Maternal Grandmother    • No Known Problems Maternal Grandfather    • No Known Problems Paternal Grandmother    • No Known Problems Paternal Grandfather    • No Known Problems Son       Current Medications:     Current Outpatient Medications   Medication Sig Dispense Refill   • calcium carbonate (OS-ARMANDO) 600 MG tablet Take 600 mg by mouth 2 (two) times a day with meals     • Fluticasone Propionate (Xhance) 93 MCG/ACT EXHU 1 actuation into each nostril 2 (two) times a day 16 mL 5   • Glucosamine-Chondroitin (MOVE FREE PO) Take by mouth       No current facility-administered medications for this visit  Allergies: Allergies   Allergen Reactions   • Sulfa Antibiotics Rash      Physical Exam:     /80 (BP Location: Left arm, Patient Position: Sitting, Cuff Size: Standard)   Pulse 70   Temp (!) 97 2 °F (36 2 °C) (Tympanic)   Resp 16   Ht 5' 2 5" (1 588 m)   Wt 64 6 kg (142 lb 6 4 oz)   SpO2 97%   BMI 25 63 kg/m²     Physical Exam  Constitutional:       General: She is not in acute distress  Appearance: She is well-developed  She is not ill-appearing, toxic-appearing or diaphoretic  HENT:      Head: Normocephalic and atraumatic  Right Ear: External ear normal  There is no impacted cerumen  Left Ear: External ear normal  There is no impacted cerumen  Mouth/Throat:      Pharynx: Oropharynx is clear  No oropharyngeal exudate or posterior oropharyngeal erythema  Eyes:      Conjunctiva/sclera: Conjunctivae normal    Cardiovascular:      Rate and Rhythm: Normal rate and regular rhythm  Heart sounds: Normal heart sounds  No murmur heard  Pulmonary:      Effort: Pulmonary effort is normal  No respiratory distress  Breath sounds: Normal breath sounds  No stridor  No wheezing or rales  Abdominal:      General: There is no distension  Palpations: Abdomen is soft  There is no mass  Tenderness: There is no abdominal tenderness  There is no guarding or rebound  Musculoskeletal:      Cervical back: Neck supple  Neurological:      Mental Status: She is alert and oriented to person, place, and time  Psychiatric:         Mood and Affect: Mood normal          Behavior: Behavior normal          Thought Content:  Thought content normal          Judgment: Judgment normal           Sandi Wilson MD  [facility]

## 2023-03-11 LAB
EST. AVERAGE GLUCOSE BLD GHB EST-MCNC: 131 MG/DL
HBA1C MFR BLD: 6.2 %

## 2023-03-14 ENCOUNTER — TELEPHONE (OUTPATIENT)
Dept: SLEEP CENTER | Facility: CLINIC | Age: 58
End: 2023-03-14

## 2023-03-14 NOTE — TELEPHONE ENCOUNTER
----- Message from Yanna Gonzalez DO sent at 3/13/2023  1:00 PM EDT -----  approved  ----- Message -----  From: Susy Babinski  Sent: 3/13/2023   8:15 AM EDT  To: [facility] Provider    This Home sleep study needs approval      If approved please sign and return to clerical pool  If denied please include reasons why  Also provide alternative testing if warranted  Please sign and return to clerical pool

## 2023-03-16 ENCOUNTER — APPOINTMENT (OUTPATIENT)
Dept: PHYSICAL THERAPY | Facility: CLINIC | Age: 58
End: 2023-03-16

## 2023-03-19 DIAGNOSIS — E78.1 HYPERTRIGLYCERIDEMIA: Primary | ICD-10-CM

## 2023-03-19 DIAGNOSIS — R73.01 IMPAIRED FASTING BLOOD SUGAR: ICD-10-CM

## 2023-03-23 ENCOUNTER — APPOINTMENT (OUTPATIENT)
Dept: PHYSICAL THERAPY | Facility: CLINIC | Age: 58
End: 2023-03-23

## 2023-03-30 ENCOUNTER — APPOINTMENT (OUTPATIENT)
Dept: PHYSICAL THERAPY | Facility: CLINIC | Age: 58
End: 2023-03-30

## 2023-06-09 ENCOUNTER — HOSPITAL ENCOUNTER (OUTPATIENT)
Dept: SLEEP CENTER | Facility: CLINIC | Age: 58
Discharge: HOME | End: 2023-06-09
Payer: COMMERCIAL

## 2023-06-09 DIAGNOSIS — R06.83 SNORING: ICD-10-CM

## 2023-06-09 PROCEDURE — G0399 HOME SLEEP TEST/TYPE 3 PORTA: HCPCS

## 2023-06-09 NOTE — PROGRESS NOTES
Home Sleep Study Documentation    HOME STUDY DEVICE: Noxturnal yes                                           Nell G3 no      Pre-Sleep Home Study:    Set-up and instructions performed by: [facility]    Technician performed demonstration for Patient: yes    Return demonstration performed by Patient: yes    Written instructions provided to Patient: yes    Patient signed consent form: yes        Post-Sleep Home Study:    Additional comments by Patient: pending    Home Sleep Study Failed:pending    Failure reason: pending    Reported or Detected: pending    Scored by: pending

## 2023-06-09 NOTE — PROGRESS NOTES
Home Sleep Study Documentation    HOME STUDY DEVICE: Noxturnal yes                                           Nell G3 no      Pre-Sleep Home Study:    Set-up and instructions performed by: [facility]    Technician performed demonstration for Patient: yes    Return demonstration performed by Patient: yes    Written instructions provided to Patient: yes    Patient signed consent form: yes        Post-Sleep Home Study:    Additional comments by Patient:     Home Sleep Study Failed:no:    Failure reason: N/A    Reported or Detected: N/A    Scored by: Tal Chappell

## 2023-06-17 ENCOUNTER — HOSPITAL ENCOUNTER (OUTPATIENT)
Dept: MAMMOGRAPHY | Facility: HOSPITAL | Age: 58
Discharge: HOME | End: 2023-06-17
Payer: COMMERCIAL

## 2023-06-17 VITALS — BODY MASS INDEX: 24.8 KG/M2 | HEIGHT: 63 IN | WEIGHT: 140 LBS

## 2023-06-17 DIAGNOSIS — Z12.31 BREAST CANCER SCREENING BY MAMMOGRAM: ICD-10-CM

## 2023-06-17 PROCEDURE — 77067 SCR MAMMO BI INCL CAD: CPT

## 2023-06-17 PROCEDURE — 77063 BREAST TOMOSYNTHESIS BI: CPT

## 2023-06-21 ENCOUNTER — TELEPHONE (OUTPATIENT)
Dept: SLEEP CENTER | Facility: CLINIC | Age: 58
End: 2023-06-21

## 2023-06-21 NOTE — TELEPHONE ENCOUNTER
Per Dr Elliott Lobe sleep study shows mild EMMETT       Spoke to the patient advised sleep study results and scheduled consult

## 2023-08-25 ENCOUNTER — ANNUAL EXAM (OUTPATIENT)
Dept: OBGYN CLINIC | Facility: CLINIC | Age: 58
End: 2023-08-25
Payer: COMMERCIAL

## 2023-08-25 VITALS
BODY MASS INDEX: 23.21 KG/M2 | DIASTOLIC BLOOD PRESSURE: 76 MMHG | WEIGHT: 131 LBS | SYSTOLIC BLOOD PRESSURE: 108 MMHG | HEIGHT: 63 IN

## 2023-08-25 DIAGNOSIS — M62.89 PELVIC FLOOR DYSFUNCTION: ICD-10-CM

## 2023-08-25 DIAGNOSIS — Z12.4 SCREENING FOR CERVICAL CANCER: ICD-10-CM

## 2023-08-25 DIAGNOSIS — Z12.31 ENCOUNTER FOR SCREENING MAMMOGRAM FOR MALIGNANT NEOPLASM OF BREAST: ICD-10-CM

## 2023-08-25 DIAGNOSIS — Z12.4 ENCOUNTER FOR SCREENING FOR MALIGNANT NEOPLASM OF CERVIX: ICD-10-CM

## 2023-08-25 DIAGNOSIS — Z11.51 SCREENING FOR HPV (HUMAN PAPILLOMAVIRUS): ICD-10-CM

## 2023-08-25 DIAGNOSIS — Z01.419 WELL WOMAN EXAM WITH ROUTINE GYNECOLOGICAL EXAM: Primary | ICD-10-CM

## 2023-08-25 DIAGNOSIS — N95.2 POST-MENOPAUSAL ATROPHIC VAGINITIS: ICD-10-CM

## 2023-08-25 PROCEDURE — G0476 HPV COMBO ASSAY CA SCREEN: HCPCS | Performed by: OBSTETRICS & GYNECOLOGY

## 2023-08-25 PROCEDURE — S0612 ANNUAL GYNECOLOGICAL EXAMINA: HCPCS | Performed by: OBSTETRICS & GYNECOLOGY

## 2023-08-25 PROCEDURE — G0145 SCR C/V CYTO,THINLAYER,RESCR: HCPCS | Performed by: OBSTETRICS & GYNECOLOGY

## 2023-08-25 NOTE — PROGRESS NOTES
ASSESSMENT & PLAN:   Diagnoses and all orders for this visit:    Well woman exam with routine gynecological exam  -     Liquid-based pap, screening    Encounter for screening for malignant neoplasm of cervix  -     Liquid-based pap, screening    Screening for HPV (human papillomavirus)  -     Liquid-based pap, screening    Encounter for screening mammogram for malignant neoplasm of breast  -     Mammo screening bilateral w 3d & cad; Future    Screening for cervical cancer  -     Ambulatory Referral to Obstetrics / Gynecology    Pelvic floor dysfunction  -     Ambulatory Referral to Physical Therapy; Future    Post-menopausal atrophic vaginitis          The following were reviewed in today's visit: ASCCP guidelines, Gardisil vaccination, STD testing breast self exam, mammography screening ordered, menopause and exercise. Patient to return to office in yearly for annual exam.     All questions have been answered to her satisfaction. CC:  Annual Gynecologic Examination  Chief Complaint   Patient presents with   • Gynecologic Exam     Patient is here today for her yearly exam, pap due today(7/10/20-wnl), mammo 23-order placed for . Colonoscopy 21-repeat 5 years. Patient is doing well, she has no concerns at this time. HPI: Vickie Marte is a 63 y.o. [id number] who presents for annual gynecologic examination. She has the following concerns:  none      Health Maintenance:    Exercise: frequently  Breast exams/breast awareness: occasionally  Last mammogram:   Colorectal cancer screenin      Past Medical History:   Diagnosis Date   • Bug bite 2020   • Rash 2020   • Varicella        Past Surgical History:   Procedure Laterality Date   •  SECTION     • POLYPECTOMY      Sinus   • NV HYSTEROSCOPY BX ENDOMETRIUM&/POLYPC W/WO D&C N/A 2021    Procedure: DILATATION AND CURETTAGE (D&C) WITH HYSTEROSCOPY (MYOSURE);   Surgeon: Monique Wagoner MD;  Location: [facility]; Service: Gynecology   • SINUS SURGERY         Past OB/Gyn History:   No LMP recorded. Patient is postmenopausal.    Menopausal status: postmenopausal  Menopausal symptoms: once or twice a month - hot flash    Last Pap: 2020 : no abnormalities  History of abnormal Pap smear: no    Patient is currently sexually active. Current contraception: post menopausal status      Family History  Family History   Problem Relation Age of Onset   • Hypertension Mother    • Hypertension Father    • No Known Problems Daughter    • No Known Problems Maternal Grandmother    • No Known Problems Maternal Grandfather    • No Known Problems Paternal Grandmother    • No Known Problems Paternal Grandfather    • No Known Problems Son        Family history of uterine or ovarian cancer: no  Family history of breast cancer: no  Family history of colon cancer: no    Social History:  Social History     Socioeconomic History   • Marital status: /Civil Union     Spouse name: Not on file   • Number of children: Not on file   • Years of education: Not on file   • Highest education level: Not on file   Occupational History   • Not on file   Tobacco Use   • Smoking status: Never     Passive exposure: Never   • Smokeless tobacco: Never   Vaping Use   • Vaping Use: Never used   Substance and Sexual Activity   • Alcohol use: Not Currently   • Drug use: No   • Sexual activity: Yes     Partners: Male     Birth control/protection: Post-menopausal   Other Topics Concern   • Not on file   Social History Narrative   • Not on file     Social Determinants of Health     Financial Resource Strain: Not on file   Food Insecurity: Not on file   Transportation Needs: Not on file   Physical Activity: Not on file   Stress: Not on file   Social Connections: Not on file   Intimate Partner Violence: Not on file   Housing Stability: Not on file     Domestic violence screen: negative    Allergies:   Allergies   Allergen Reactions   • Sulfa Antibiotics Rash Medications:    Current Outpatient Medications:   •  calcium carbonate (OS-ARMANDO) 600 MG tablet, Take 600 mg by mouth 2 (two) times a day with meals, Disp: , Rfl:   •  Fluticasone Propionate (Xhance) 93 MCG/ACT EXHU, 1 actuation into each nostril 2 (two) times a day, Disp: 16 mL, Rfl: 5  •  Glucosamine-Chondroitin (MOVE FREE PO), Take by mouth, Disp: , Rfl:     Review of Systems:  Review of Systems   Constitutional: Negative for chills and fever. Respiratory: Negative for shortness of breath. Cardiovascular: Negative for chest pain. Gastrointestinal: Negative for abdominal distention, abdominal pain, blood in stool, constipation, nausea and vomiting. Genitourinary: Positive for dyspareunia (dryness). Negative for difficulty urinating, dysuria, frequency, pelvic pain, urgency, vaginal bleeding, vaginal discharge and vaginal pain. Neurological: Negative for headaches (few headaches). Physical Exam:  /76 (BP Location: Left arm, Patient Position: Sitting, Cuff Size: Standard)   Ht 5' 2.5" (1.588 m)   Wt 59.4 kg (131 lb)   BMI 23.58 kg/m²    Physical Exam  Constitutional:       General: She is awake. Appearance: Normal appearance. She is well-developed. Genitourinary:      Vulva, bladder and urethral meatus normal.      Right Labia: No rash, tenderness or lesions. Left Labia: No tenderness, lesions or rash. No labial fusion noted. No vaginal discharge, erythema, tenderness or bleeding. No vaginal prolapse present. Mild vaginal atrophy present. Vaginal exam comments: Taut pelvic muscles, tenderness on palpation. .        Right Adnexa: not tender, not full and no mass present. Left Adnexa: not tender, not full and no mass present. Cervix is parous. No cervical motion tenderness, discharge, lesion or polyp. Uterus is not enlarged, fixed, tender or irregular. No uterine mass detected. Uterus is anteverted.       No urethral prolapse present. Bladder is not tender. Pelvic exam was performed with patient in the lithotomy position. Breasts:     Right: No inverted nipple, mass, nipple discharge, skin change or tenderness. Left: No inverted nipple, mass, nipple discharge, skin change or tenderness. HENT:      Head: Normocephalic and atraumatic. Cardiovascular:      Rate and Rhythm: Normal rate and regular rhythm. Heart sounds: Normal heart sounds. Pulmonary:      Effort: Pulmonary effort is normal. No tachypnea or respiratory distress. Breath sounds: Normal breath sounds. Abdominal:      General: Abdomen is flat. There is no distension. Palpations: Abdomen is soft. Tenderness: There is no abdominal tenderness. There is no guarding or rebound. Musculoskeletal:      Cervical back: Neck supple. Lymphadenopathy:      Upper Body:      Right upper body: No supraclavicular or axillary adenopathy. Left upper body: No supraclavicular or axillary adenopathy. Neurological:      General: No focal deficit present. Mental Status: She is alert and oriented to person, place, and time. Psychiatric:         Mood and Affect: Mood normal.         Behavior: Behavior normal.         Thought Content: Thought content normal.         Judgment: Judgment normal.   Vitals reviewed.

## 2023-08-25 NOTE — PATIENT INSTRUCTIONS
Reyna    Physical Therapy at [facility] team of trained female therapists offer a wide variety of services designed to address the special healthcare needs of women. Our specially trained clinicians work with you to address your concerns and come beside you to support and encourage you through the healing process. Our offices are designed to keep your sensitive treatments private at all times. We are here to ensure your comfort and mentor you through our pelvic floor therapy programs at your pace. Our female team of experts treat the following conditions faced by women:      Urinary or bowel incontinence  Urinary urgency or frequency  Painful [place]  Painful Bladder Syndrome  Overactive Bladder  Constipation  Pelvic Girdle Pain             Sacroiliac Dysfunction   Gynecological Cancers    Pregnancy & Postpartum related discomfort  Buttock/Tailbone Pain                       Lumbar/Thoracic Pain  Hip Abnormalities/Pain    Scar Adhesions  Diastasis Recti  Osteoporosis          /Episiotomy Recovery  Vulvodynia  Pelvic Pain      VAGINAL DRYNESS OVERVIEW    Vaginal dryness is a common condition in postmenopausal women (ie, women who have been through menopause). This condition is also common in women who have had both of their ovaries surgically removed, for example, to treat or prevent cancer. You may hear your health care provider use the term "atrophic vaginitis" or "genitourinary syndrome of menopause."    In some women, vaginal dryness leads to uncomfortable symptoms, such as pain with sex, burning vaginal discomfort or itching, or abnormal vaginal discharge. There may also be related urinary symptoms, such as frequent or painful urination. Other women do not notice bothersome symptoms at all. Fortunately, there are several effective treatments available.      VAGINAL DRYNESS CAUSES    The hormone estrogen helps to keep the vagina moist, maintain thickness of the vaginal lining, and keep the tissue flexible (also called "elasticity"). Vaginal dryness occurs when your body produces a decreased amount of estrogen. This can be permanent or temporary and can occur at different times throughout life, such as:    ?At the time of menopause (one year after monthly periods stop). ? After surgical removal of the ovaries, chemotherapy, or radiation therapy of the pelvis to treat cancer. ? After having a baby in women who are breastfeeding - Estrogen production returns to normal when breastfeeding becomes less frequent or is stopped. ? While using certain medications, such as danazol, medroxyprogesterone (brand names: Provera, Depo-Provera), leuprolide (brand name: Lupron), or nafarelin - When these medications are stopped, estrogen production usually returns to normal.    VAGINAL DRYNESS TREATMENT    There are several treatment options for women with vaginal dryness. Some, such as vaginal moisturizers or lubricants, are available without a prescription. Others require a prescription, including a vaginal estrogen cream, tablet, capsule, or ring; an oral medication called ospemifene; and a vaginal tablet called prasterone. These treatments usually work temporarily; your symptoms will return when the treatment is stopped unless your ovaries start producing more estrogen again. Vaginal lubricants and moisturizers -- You can buy these without a prescription in most pharmacies. Vaginal lubricants and moisturizers do not contain any hormones and have virtually no systemic (body-wide) side effects. Possible local side effects include irritation or a burning feeling after application. ?Lubricants are designed to reduce friction and discomfort from dryness during sexual intercourse. The lubricant is applied inside the vagina and/or on the partner's penis or fingers just before sex.  Products sold specifically as vaginal lubricants are more effective than lubricants that are not designed for this purpose, such as petroleum jelly. In addition, oil-based lubricants like petroleum jelly, baby oil, or mineral oil can damage latex condoms and/or diaphragms and make them less effective in preventing pregnancy or sexually transmitted infections. Lubricants that are made with water or silicones can be used with latex condoms and diaphragms. Polyurethane condoms can be used with oil-based products. Natural lubricants, such as olive, coconut, avocado, or peanut oil, are easily available products that may be used as a lubricant with sex. However, it is important to know that, like the oil-based lubricants, natural oils are not recommended for use with latex condoms or diaphragms, as they can damage the latex. Water- or silicone-based lubricants are a better choice if you use condoms or a diaphragm. ? Vaginal moisturizers are formulated to allow the vaginal tissues to retain moisture more effectively. Moisturizers are applied into the vagina approximately three times weekly to allow a continuous moisturizing effect. Be sure to check the label to ensure that you are purchasing a moisturizer and not a lubricant. Hand and body lotions and moisturizers should not be used to relieve vaginal dryness since they can be irritating to the vaginal tissues. Vaginal estrogen -- Vaginal estrogen is one of the most effective treatment options for vaginal dryness. Vaginal estrogen requires a prescription from your health care provider. Some women worry about possible side effects from hormones. Very low doses of estrogen can be used to treat vaginal dryness when it is in the form of a vaginal cream or insertable tablet, capsule, or ring. A small amount of estrogen is absorbed into the bloodstream with these vaginal forms, but when used regularly, the level of estrogen in the blood is in the same range as in postmenopausal women who are not using vaginal estrogen.  As a result, there is a much lower risk of the side effects people sometimes worry about, such as blood clots, breast cancer, and heart attack, compared with other estrogen-containing products (eg, birth control pills, menopausal hormone therapy). Several types of vaginal estrogen products are available:    ? Cream - Estrogen cream is measured with an applicator and inserted into the vagina (if inserting the applicator is uncomfortable, you can also use your finger). The cream is usually inserted every day for two weeks and then one or two times weekly after that. With some doses, you may also need to a take another hormonal medication, called a progestin. This is to prevent the lining of your uterus from building up and becoming precancerous or cancerous. Ask your health care provider whether you need to take a progestin with the vaginal estrogen cream you are using. ?Tablet or capsule - The vaginal estrogen insert is a small tablet or capsule that you put in your vagina. It comes packaged in a disposable applicator. It is usually inserted every day for two weeks and then twice weekly after that. Some women find it uncomfortable to insert the applicator at first, when vaginal dryness is at its worst; if you have this problem, ask your health care provider if you should use another form of vaginal estrogen. ? Ring - The vaginal estrogen ring (brand name: Estring) is a flexible plastic ring you wear inside your vagina all the time. You replace it with a new ring every three months. The ring does not need to be removed during sex or bathing. It cannot be felt by most women or their sexual partners. In women who have previously had a hysterectomy, the ring will sometimes fall out. The estrogen ring used to treat vaginal dryness should not be confused with a different estrogen replacement vaginal ring (brand name: Hillpoint Rotunda), which releases a much higher dose of estrogen.  The estrogen in the higher dose ring is intended to be absorbed into the body to relieve hot flashes in women going through menopause. How long can I use vaginal estrogen? -- As low-dose vaginal estrogen is associated with few adverse effects, it can probably be used indefinitely, although there are no long-term studies to provide data about the effects over many years. Is vaginal estrogen safe if I have a history of breast cancer? -- The safety of vaginal estrogen in women who have a past history of breast cancer is debated. A small amount of estrogen can be absorbed from the vagina into the bloodstream. If you have a history of breast cancer, talk to your health care provider or oncologist about the potential risks and benefits of vaginal estrogen. Prasterone (dehydroepiandrosterone) -- Prasterone, also known as dehydroepiandrosterone (DHEA), is also an option for women with vaginal dryness due to menopause. It comes in the form of a suppository that you insert into your vagina once a day. Vaginal estrogen therapy is more commonly used than prasterone because vaginal estrogen has been studied more thoroughly and the dosing schedule may be more convenient. However, prasterone is an option for women who cannot take estrogen or prefer to avoid it, but who can use other vaginal hormones. Ospemifene -- Ospemifene is a prescription medication that is similar to estrogen but is not estrogen. In the vaginal tissue, it acts similarly to estrogen. It comes in a pill and is prescribed for women who want to use an estrogen-like medication for vaginal dryness but prefer not to use (or have trouble inserting) a vaginal medication. Ospemifene may cause hot flashes as a side effect; it may also increase the risk of blood clots or uterine cancer. Long-term studies of ospemifene are needed to evaluate the risk of these complications.  This medication has not been tested in women who have had breast cancer or are at a high risk of developing breast cancer. Sexual activity -- Vaginal estrogen improves dryness quickly, usually within a few weeks. If sex is not uncomfortable, you may continue to have sex as you treat vaginal dryness. [place] may help the vaginal tissues by keeping them soft and stretchable. If sex continues to be painful despite treatment for vaginal dryness, talk to your health care provider. There may be other things you can try such as pelvic floor physical therapy.      [email or web address]

## 2023-08-29 NOTE — RESULT ENCOUNTER NOTE
Janette Garzae,     Your HPV testing is negative. Your pap is still pending, and will be updated soon. Please contact the office at [phone] with any questions.      Jorge A

## 2023-09-01 LAB
LAB AP GYN PRIMARY INTERPRETATION: NORMAL
Lab: NORMAL

## 2023-11-07 ENCOUNTER — TELEPHONE (OUTPATIENT)
Age: 58
End: 2023-11-07

## 2023-11-07 NOTE — TELEPHONE ENCOUNTER
Patient will be traveling to [facility] 12/2/23. She received an e-mail that whooping cough is a risk at that time. Should patient get vaccinated against whooping cough before she leaves?

## 2023-11-07 NOTE — TELEPHONE ENCOUNTER
It is included in the vaccine she received in March.  She had the tetanus with whooping cough vaccine

## 2024-01-10 ENCOUNTER — OFFICE VISIT (OUTPATIENT)
Dept: SLEEP CENTER | Facility: CLINIC | Age: 59
End: 2024-01-10
Payer: COMMERCIAL

## 2024-01-10 VITALS
BODY MASS INDEX: 21.62 KG/M2 | OXYGEN SATURATION: 98 % | WEIGHT: 122 LBS | HEIGHT: 63 IN | SYSTOLIC BLOOD PRESSURE: 102 MMHG | DIASTOLIC BLOOD PRESSURE: 68 MMHG | HEART RATE: 63 BPM

## 2024-01-10 DIAGNOSIS — G47.09 OTHER INSOMNIA: ICD-10-CM

## 2024-01-10 DIAGNOSIS — R40.0 DAYTIME SLEEPINESS: ICD-10-CM

## 2024-01-10 DIAGNOSIS — G47.33 MILD OBSTRUCTIVE SLEEP APNEA: Primary | ICD-10-CM

## 2024-01-10 DIAGNOSIS — F45.8 BRUXISM: ICD-10-CM

## 2024-01-10 DIAGNOSIS — F32.A DEPRESSION, UNSPECIFIED DEPRESSION TYPE: ICD-10-CM

## 2024-01-10 DIAGNOSIS — J32.8 OTHER CHRONIC SINUSITIS: ICD-10-CM

## 2024-01-10 PROCEDURE — 99204 OFFICE O/P NEW MOD 45 MIN: CPT | Performed by: INTERNAL MEDICINE

## 2024-01-10 NOTE — PATIENT INSTRUCTIONS
What is EMMETT?   Obstructive sleep apnea is a common and serious sleep disorder that causes you to stop breathing during sleep. The airway repeatedly becomes blocked, limiting the amount of air that reaches your lungs. When this happens, you may snore loudly or making choking noises as you try to breathe. Your brain and body becomes oxygen deprived and you may wake up. This may happen a few times a night, or in more severe cases, several hundred times a night.   Sleep apnea can make you wake up in the morning feeling tired or unrefreshed even though you have had a full night of sleep. During the day, you may feel fatigued, have difficulty concentrating or you may even unintentionally fall asleep. This is because your body is waking up numerous times throughout the night, even though you might not be conscious of each awakening.  The lack of oxygen your body receives can have negative long-term consequences for your health. This includes:  High blood pressure  Heart disease  Irregular heart rhythms  Stroke  Pre-diabetes and diabetes  Depression  Testing  An objective evaluation of your sleep may be needed before your board certified sleep physician can make a diagnosis. Options include:   In-lab overnight sleep study  This type of sleep study requires you to stay overnight at a sleep center, in a bed that may resemble a hotel room. You will sleep with sensors hooked up to various parts of your body. These sensors record your brain waves, heartbeat, breathing and movement. An overnight sleep study also provides your doctor with the most complete information about your sleep. Learn more about an overnight sleep study.   Home sleep apnea test  Some patients with high risk factors for obstructive sleep apnea and no other medical disorders may be candidates for a home sleep apnea test. The testing equipment differs in that it is less complicated than what is used in an overnight sleep study. As such, does not give all the  data an in-lab will and if negative, may not mean you do not have the problem.  Treatment for sleep apnea includes using a continuous positive airway pressure (CPAP) machine to keep your airway open during sleep. A mask is placed over your nose and mouth, or just your nose. The mask is hooked to the CPAP machine that blows a gentle stream of air into the mask when you breathe. This helps keep your airway open so you can breathe more regularly. Extra oxygen may be given to you through the machine. You may be given a mouth device. It looks like a mouth guard or dental retainer and stops your tongue and mouth tissues from blocking your throat while you sleep. Surgery may be needed to remove extra tissues that block your mouth, throat, or nose.  Manage sleep apnea:   Do not smoke. Nicotine and other chemicals in cigarettes and cigars can cause lung damage. Ask your healthcare provider for information if you currently smoke and need help to quit. E-cigarettes or smokeless tobacco still contain nicotine. Talk to your healthcare provider before you use these products.  Do not drink alcohol or take sedative medicine before you go to sleep. Alcohol and sedatives can relax the muscles and tissues around your throat. This can block the airflow to your lungs.  Maintain a healthy weight. Excess tissue around your throat may restrict your breathing. Ask your healthcare provider for information if you need to lose weight.  Sleep on your side or use pillows designed to prevent sleep apnea. This prevents your tongue or other tissues from blocking your throat. You can also raise the head of your bed.  Driving Safety. Refrain from driving when drowsy.   Follow up with your healthcare provider as directed: Write down your questions so you remember to ask them during your visits.  Go to AASM website for more information: Sleepeducation.org  What is EMMETT?   Obstructive sleep apnea is a common and serious sleep disorder that causes you to  stop breathing during sleep. The airway repeatedly becomes blocked, limiting the amount of air that reaches your lungs. When this happens, you may snore loudly or making choking noises as you try to breathe. Your brain and body becomes oxygen deprived and you may wake up. This may happen a few times a night, or in more severe cases, several hundred times a night.   Sleep apnea can make you wake up in the morning feeling tired or unrefreshed even though you have had a full night of sleep. During the day, you may feel fatigued, have difficulty concentrating or you may even unintentionally fall asleep. This is because your body is waking up numerous times throughout the night, even though you might not be conscious of each awakening.  The lack of oxygen your body receives can have negative long-term consequences for your health. This includes:  High blood pressure  Heart disease  Irregular heart rhythms  Stroke  Pre-diabetes and diabetes  Depression  Testing  An objective evaluation of your sleep may be needed before your board certified sleep physician can make a diagnosis. Options include:   In-lab overnight sleep study  This type of sleep study requires you to stay overnight at a sleep center, in a bed that may resemble a hotel room. You will sleep with sensors hooked up to various parts of your body. These sensors record your brain waves, heartbeat, breathing and movement. An overnight sleep study also provides your doctor with the most complete information about your sleep. Learn more about an overnight sleep study.   Home sleep apnea test  Some patients with high risk factors for obstructive sleep apnea and no other medical disorders may be candidates for a home sleep apnea test. The testing equipment differs in that it is less complicated than what is used in an overnight sleep study. As such, does not give all the data an in-lab will and if negative, may not mean you do not have the problem.  Treatment for  sleep apnea includes using a continuous positive airway pressure (CPAP) machine to keep your airway open during sleep. A mask is placed over your nose and mouth, or just your nose. The mask is hooked to the CPAP machine that blows a gentle stream of air into the mask when you breathe. This helps keep your airway open so you can breathe more regularly. Extra oxygen may be given to you through the machine. You may be given a mouth device. It looks like a mouth guard or dental retainer and stops your tongue and mouth tissues from blocking your throat while you sleep. Surgery may be needed to remove extra tissues that block your mouth, throat, or nose.  Manage sleep apnea:   Do not smoke. Nicotine and other chemicals in cigarettes and cigars can cause lung damage. Ask your healthcare provider for information if you currently smoke and need help to quit. E-cigarettes or smokeless tobacco still contain nicotine. Talk to your healthcare provider before you use these products.  Do not drink alcohol or take sedative medicine before you go to sleep. Alcohol and sedatives can relax the muscles and tissues around your throat. This can block the airflow to your lungs.  Maintain a healthy weight. Excess tissue around your throat may restrict your breathing. Ask your healthcare provider for information if you need to lose weight.  Sleep on your side or use pillows designed to prevent sleep apnea. This prevents your tongue or other tissues from blocking your throat. You can also raise the head of your bed.  Driving Safety. Refrain from driving when drowsy.   Follow up with your healthcare provider as directed: Write down your questions so you remember to ask them during your visits.  Go to AASM website for more information: Sleepeducation.org    Nursing Support:  When: Monday through Friday 7A-5PM except holidays  Where: Our direct line is [phone].    If you are having a true emergency please call 911.  In the event that the  line is busy or it is after hours please leave a voice message and we will return your call.  Please speak clearly, leaving your full name, birth date, best number to reach you and the reason for your call.   Medication refills: We will need the name of the medication, the dosage, the ordering provider, whether you get a 30 or 90 day refill, and the pharmacy name and address.  Medications will be ordered by the provider only.  Nurses cannot call in prescriptions.  Please allow 7 days for medication refills.  Physician requested updates: If your provider requested that you call with an update after starting medication, please be ready to provide us the medication and dosage, what time you take your medication, the time you attempt to fall asleep, time you fall asleep, when you wake up, and what time you get out of bed.  Sleep Study Results: We will contact you with sleep study results and/or next steps after the physician has reviewed your testing.

## 2024-01-10 NOTE — PROGRESS NOTES
" Consultation - Sleep Center   Mary Narayan  58 y.o. female  :1965  MRN:[id number]  DOS:1/10/2024    Physician Requesting Consult: Lea Reyes, MD             Reason for Consult : At your kind request I saw Mary Narayan for initial sleep evaluation today.  Home sleep testing was undertaken to evaluate for sleep disordered breathing and patient is here to review results and further options.     The study demonstrated a respiratory event index (SEBASTIÁN) of 5.2.  The lowest SpO2 recorded is 83% and 1% of the study was spent with saturations below 90%.  The snore index was 1%.     Additional comments by patient: She had trouble sleeping that she had attributed to \"Jet leg \".       PFSH, Problem List, Medications & Allergies were reviewed in EMR.    Mary  has a past medical history of Bug bite (2020), Rash (2020), and Varicella.      She has a current medication list which includes the following prescription(s): calcium carbonate, xhance, and glucosamine-chondroitin.      HPI:  patient's presents with: Snoring/breathing pauses in sleep of 6 to 12 months duration..  Family have noted snoring.   Other complaints: None. Restless Leg Syndrome: reports no suggestive symptoms.    Parasomnia: reports teeth grinding during sleep;   Sleep Routine (averaged): Typical Bedtime: 11 PM- 12 AM.  Gets OOB: 7 AM. TIB:>7 hrs.   Sleep latency:<  30 minutes Sleep Interruptions: 1 to 2 times per night, because of nocturia and struggles to fall back asleep because of mind racing. Awakens: Wake on own, without alarm spontaneously  Upon awakening: Usually feels refreshed.  She estimates getting 7 hrs sleep.  Daytime Function:Mary reports daytime sleepiness \"if I do not get at least 7 hours sleep \"and takes planned naps several times a week for up to 30 minutes. She rated herself at Total score: 13 /24 on the [place] Sleepiness Scale.     Habits:   reports that she has never smoked. She has never been exposed to tobacco " "smoke. She has never used smokeless tobacco.;  reports that she does not currently use alcohol.; Reports no history of drug use.;  E-Cigarette/Vaping    E-Cigarette Use  Never User; Caffeine use:none; Exercise routine: regular.    Occupation: Work Full Time   Family History: Father snfariha  ROS: Significant for intentional weight reduction of around 10 pounds.  He is reporting no nasal symptoms but does have a history of nasal polyps..     EXAM:  /68   Pulse 63   Ht 5' 2.5\" (1.588 m)   Wt 55.3 kg (122 lb)   SpO2 98%   BMI 21.96 kg/m²    General: Well groomed female, well appearing, in no apparent distress.   Neurological: Alert and orientated; cooperative; Cranial nerves intact;    Psychiatric: Speech: Clear and coherent; normal mood, affect & thought   Skin: Warm and dry; Color& Hydration good; no facial rashes or lesions   HEENT:  Craniofacial anatomy: normal Sinuses: Non-tender. TMJ: Normal    Eyes: EOM's intact; conjunctiva/corneas clear   Ears: Appear normal     Nasal Airway: is patent Septum: Intact; Turbinates: Normal; Rhinorrhea: None  Mouth: Lips: Normal posture; Dentition: normal . Mucosa: Moist; Hard Palate:normal    Oropharryx: crowded and AP narrowing Tongue: Mallampati:Class IV and MobileSoft Palate:  redundant  Tonsils: absent  Neck:; Neck Circumference: 12.5 \"; supple; no abnormal masses; Thyroid: Normal. Trachea: Central.    Lymph: No cervical or submandibular Lymhadenopathy  Heart: S1,S2 normal; RRR; no gallop; no murmur   Lungs: Respiratory Effort: Normal. Air entry good bilaterally.  No wheezes.  No rales  Abdomen:  Soft & non-tender    Extremities: No pedal edema.  No clubbing or cyanosis.    Musculoskeletal:  Motor normal; Gait: Normal.       IMPRESSION: Primary/Secondary Sleep Diagnoses (to Medical or Psych conditions) & Comorbidities   1. Mild obstructive sleep apnea        2. Other insomnia        3. Bruxism        4. Daytime sleepiness        5. Other chronic sinusitis        6. " "Depression, unspecified depression type        7. BMI 21.0-21.9, adult             PLAN:   1. I reviewed results of the Sleep study with the patient.   2. With respect to above conditions, I counseled on pathophysiology, diagnosis, treatment options, risks and benefits; inter-relationship and effects on symptoms and comorbidities; risks of no treatment; costs and insurance aspects.   3. Patient declined all treatment options including positive airway pressure therapy or mandibular advancement device.   4.  She has Invisalign that would help with bruxism.  5.  Nasal symptoms may improve with regular nasal saline rinse up to twice a day, followed by topical nasal steroid (e..g. OTC Flonase, Nasacort) once a day if necessary.  She is also under care of of ENT.  6. I recommended adjunctive positional therapy.  7. Patient was invited to return if interested in attempting positive airway pressure therapy.  8.  Multi component Cognitive behavioral therapy for Insomnia undertaken - Sleep Restriction, Stimulus control, Relaxation techniques and Sleep hygiene were discussed.  9.  If feelings of depression persist, she may warrant an SSRI.  10.  She declined follow-up in sleep clinic and call for follow-up as needed.    Sincerely,      Authenticated electronically on 01/10/24   Board Certified Specialist     Portions of the record may have been created with voice recognition software. Occasional wrong word or \"sound a like\" substitutions may have occurred due to the inherent limitations of voice recognition software. There may also be notations and random deletions of words or characters from malfunctioning software. Read the chart carefully and recognize, using context, where substitutions/deletions have occurred.     "

## 2024-02-26 ENCOUNTER — OFFICE VISIT (OUTPATIENT)
Dept: INTERNAL MEDICINE CLINIC | Facility: CLINIC | Age: 59
End: 2024-02-26
Payer: COMMERCIAL

## 2024-02-26 VITALS
SYSTOLIC BLOOD PRESSURE: 106 MMHG | WEIGHT: 123 LBS | HEIGHT: 63 IN | BODY MASS INDEX: 21.79 KG/M2 | HEART RATE: 63 BPM | DIASTOLIC BLOOD PRESSURE: 70 MMHG | OXYGEN SATURATION: 99 %

## 2024-02-26 DIAGNOSIS — J06.9 UPPER RESPIRATORY TRACT INFECTION, UNSPECIFIED TYPE: Primary | ICD-10-CM

## 2024-02-26 PROCEDURE — 99213 OFFICE O/P EST LOW 20 MIN: CPT | Performed by: GENERAL ACUTE CARE HOSPITAL

## 2024-02-26 RX ORDER — BENZONATATE 200 MG/1
200 CAPSULE ORAL 3 TIMES DAILY PRN
Qty: 30 CAPSULE | Refills: 0 | Status: SHIPPED | OUTPATIENT
Start: 2024-02-26

## 2024-02-26 NOTE — ASSESSMENT & PLAN NOTE
URI symptoms that improving in general  Viral likely  Continue symptoms relief management  Benzonatate sent

## 2024-02-26 NOTE — PROGRESS NOTES
"Name: Mary Narayan      : 1965      MRN: [id number]  Encounter Provider: Morenita Villar MD  Encounter Date: 2024   Enc[facility]    Assessment & Plan     1. Upper respiratory tract infection, unspecified type  Assessment & Plan:  URI symptoms that improving in general  Viral likely  Continue symptoms relief management  Benzonatate sent    Orders:  -     benzonatate (TESSALON) 200 MG capsule; Take 1 capsule (200 mg total) by mouth 3 (three) times a day as needed for cough           Subjective      HPI  UTI symptoms for 7 days  Improving in general  Review of Systems    Current Outpatient Medications on File Prior to Visit   Medication Sig    calcium carbonate (OS-ARMANDO) 600 MG tablet Take 600 mg by mouth 2 (two) times a day with meals    Fluticasone Propionate (Xhance) 93 MCG/ACT EXHU 1 actuation into each nostril 2 (two) times a day    Glucosamine-Chondroitin (MOVE FREE PO) Take by mouth       Objective     /70 (BP Location: Left arm, Patient Position: Sitting, Cuff Size: Standard)   Pulse 63   Ht 5' 2.5\" (1.588 m)   Wt 55.8 kg (123 lb)   SpO2 99%   BMI 22.14 kg/m²     Physical Exam  Morenita Villar MD    "

## 2024-03-06 ENCOUNTER — RA CDI HCC (OUTPATIENT)
Dept: OTHER | Facility: HOSPITAL | Age: 59
End: 2024-03-06

## 2024-11-04 ENCOUNTER — ANNUAL EXAM (OUTPATIENT)
Dept: OBGYN CLINIC | Facility: CLINIC | Age: 59
End: 2024-11-04
Payer: COMMERCIAL

## 2024-11-04 VITALS
HEIGHT: 63 IN | BODY MASS INDEX: 22.68 KG/M2 | DIASTOLIC BLOOD PRESSURE: 64 MMHG | WEIGHT: 128 LBS | SYSTOLIC BLOOD PRESSURE: 116 MMHG

## 2024-11-04 DIAGNOSIS — Z12.31 ENCOUNTER FOR SCREENING MAMMOGRAM FOR MALIGNANT NEOPLASM OF BREAST: ICD-10-CM

## 2024-11-04 DIAGNOSIS — Z01.419 WOMEN'S ANNUAL ROUTINE GYNECOLOGICAL EXAMINATION: Primary | ICD-10-CM

## 2024-11-04 PROCEDURE — S0612 ANNUAL GYNECOLOGICAL EXAMINA: HCPCS | Performed by: OBSTETRICS & GYNECOLOGY

## 2024-11-04 NOTE — PROGRESS NOTES
ASSESSMENT & PLAN:   Diagnoses and all orders for this visit:    Women's annual routine gynecological examination    Encounter for screening mammogram for malignant neoplasm of breast  -     Cancel: Mammo screening bilateral w 3d and cad; Future  -     Mammo screening bilateral w 3d and cad; Future          The following were reviewed in today's visit: ASCCP guidelines, Gardisil vaccination, STD testing breast self exam, mammography screening ordered, menopause, and exercise.    Patient to return to office in yearly for annual exam.     All questions have been answered to her satisfaction.        CC:  Annual Gynecologic Examination  Chief Complaint   Patient presents with   • Gynecologic Exam     Pt is here for her yearly exam. Mammo ordered.  D&C on 21  Pap 23 Neg pap/HPV  Pap 07/10/2020 Neg pap/ Neg HPV   Last mammo 2023 -scheduled 26  Last colonoscopy 2021- Repeat 5 years          HPI: Mary Narayan is a 59 y.o.  who presents for annual gynecologic examination.  She has the following concerns:  she is feeling better. Occasional pain in b/l LQ.       Health Maintenance:    Exercise: frequently  Breast exams/breast awareness: yes  Last mammogram:   Colorectal cancer screenin    Past Medical History:   Diagnosis Date   • Bug bite 2020   • Rash 2020   • Varicella        Past Surgical History:   Procedure Laterality Date   •  SECTION     • POLYPECTOMY      Sinus   • PA HYSTEROSCOPY BX ENDOMETRIUM&/POLYPC W/WO D&C N/A 2021    Procedure: DILATATION AND CURETTAGE (D&C) WITH HYSTEROSCOPY (MYOSURE);  Surgeon: Maggie Squires MD;  Location: [facility] MAIN OR;  Service: Gynecology   • SINUS SURGERY         Past OB/Gyn History:   No LMP recorded. Patient is postmenopausal.    Menopausal status: postmenopausal  Menopausal symptoms: None    Last Pap:  : no abnormalities  History of abnormal Pap smear: no    Patient is currently sexually active.     Family  History  Family History   Problem Relation Age of Onset   • Hypertension Mother    • Hypertension Father    • No Known Problems Daughter    • No Known Problems Son    • No Known Problems Maternal Grandmother    • No Known Problems Maternal Grandfather    • No Known Problems Paternal Grandmother    • No Known Problems Paternal Grandfather        Family history of uterine or ovarian cancer: no  Family history of breast cancer: no  Family history of colon cancer: no    Social History:  Social History     Socioeconomic History   • Marital status: /Civil Union     Spouse name: Not on file   • Number of children: Not on file   • Years of education: Not on file   • Highest education level: Not on file   Occupational History   • Not on file   Tobacco Use   • Smoking status: Never     Passive exposure: Never   • Smokeless tobacco: Never   • Tobacco comments:     Never used   Vaping Use   • Vaping status: Never Used   Substance and Sexual Activity   • Alcohol use: Never   • Drug use: Never   • Sexual activity: Yes     Partners: Male     Birth control/protection: None   Other Topics Concern   • Not on file   Social History Narrative   • Not on file     Social Determinants of Health     Financial Resource Strain: Not on file   Food Insecurity: Not on file   Transportation Needs: Not on file   Physical Activity: Not on file   Stress: Not on file   Social Connections: Not on file   Intimate Partner Violence: Not on file   Housing Stability: Not on file     Domestic violence screen: negative    Allergies:  Allergies   Allergen Reactions   • Sulfa Antibiotics Rash   • Other Allergic Rhinitis     Seasonal allergies       Medications:    Current Outpatient Medications:   •  benzonatate (TESSALON) 200 MG capsule, Take 1 capsule (200 mg total) by mouth 3 (three) times a day as needed for cough, Disp: 30 capsule, Rfl: 0  •  calcium carbonate (OS-ARMANDO) 600 MG tablet, Take 600 mg by mouth 2 (two) times a day with meals, Disp: , Rfl:  "  •  Fluticasone Propionate (Xhance) 93 MCG/ACT EXHU, 1 actuation into each nostril 2 (two) times a day, Disp: 16 mL, Rfl: 5  •  Glucosamine-Chondroitin (MOVE FREE PO), Take by mouth, Disp: , Rfl:     Review of Systems:  Review of Systems   Constitutional:  Negative for chills and fever.   Respiratory:  Negative for shortness of breath.    Cardiovascular:  Negative for chest pain.   Gastrointestinal:  Negative for abdominal distention, abdominal pain, blood in stool, constipation, nausea and vomiting.   Genitourinary:  Negative for difficulty urinating, dyspareunia, dysuria, frequency, pelvic pain, urgency, vaginal bleeding, vaginal discharge and vaginal pain.   Neurological:  Negative for headaches.         Physical Exam:  /64 (BP Location: Right arm, Patient Position: Sitting, Cuff Size: Standard)   Ht 5' 2.5\" (1.588 m)   Wt 58.1 kg (128 lb)   BMI 23.04 kg/m²    Physical Exam  Constitutional:       General: She is awake.      Appearance: Normal appearance. She is well-developed.   Genitourinary:      Vulva, bladder and urethral meatus normal.      Right Labia: No rash, tenderness or lesions.     Left Labia: No tenderness, lesions or rash.     No labial fusion noted.      No vaginal discharge, erythema, tenderness or bleeding.      No vaginal prolapse present.     No vaginal atrophy present.       Right Adnexa: not tender, not full and no mass present.     Left Adnexa: not tender, not full and no mass present.     Cervix is parous.      No cervical motion tenderness, discharge, lesion or polyp.      Uterus is not enlarged, tender or irregular.      No uterine mass detected.     No urethral prolapse present.      Bladder is not tender.       Pelvic exam was performed with patient in the lithotomy position.   Breasts:     Right: No inverted nipple, mass, nipple discharge, skin change or tenderness.      Left: No inverted nipple, mass, nipple discharge, skin change or tenderness.   HENT:      Head: " Normocephalic and atraumatic.   Cardiovascular:      Rate and Rhythm: Normal rate and regular rhythm.      Heart sounds: Normal heart sounds.   Pulmonary:      Effort: Pulmonary effort is normal. No tachypnea or respiratory distress.      Breath sounds: Normal breath sounds.   Abdominal:      General: Abdomen is flat. There is no distension.      Palpations: Abdomen is soft.      Tenderness: There is no abdominal tenderness. There is no guarding or rebound.   Musculoskeletal:      Cervical back: Neck supple.   Lymphadenopathy:      Upper Body:      Right upper body: No supraclavicular or axillary adenopathy.      Left upper body: No supraclavicular or axillary adenopathy.   Neurological:      General: No focal deficit present.      Mental Status: She is alert and oriented to person, place, and time.   Psychiatric:         Mood and Affect: Mood normal.         Behavior: Behavior normal.         Thought Content: Thought content normal.         Judgment: Judgment normal.   Vitals reviewed.

## 2024-11-13 ENCOUNTER — EVALUATION (OUTPATIENT)
Dept: PHYSICAL THERAPY | Facility: CLINIC | Age: 59
End: 2024-11-13
Payer: COMMERCIAL

## 2024-11-13 DIAGNOSIS — M25.572 CHRONIC PAIN OF LEFT ANKLE: ICD-10-CM

## 2024-11-13 DIAGNOSIS — M54.16 LUMBAR RADICULOPATHY: ICD-10-CM

## 2024-11-13 DIAGNOSIS — G89.29 CHRONIC PAIN OF LEFT ANKLE: ICD-10-CM

## 2024-11-13 DIAGNOSIS — M79.672 FOOT PAIN, LEFT: Primary | ICD-10-CM

## 2024-11-13 PROCEDURE — 97110 THERAPEUTIC EXERCISES: CPT | Performed by: PHYSICAL THERAPIST

## 2024-11-13 PROCEDURE — 97161 PT EVAL LOW COMPLEX 20 MIN: CPT | Performed by: PHYSICAL THERAPIST

## 2024-11-13 PROCEDURE — 97112 NEUROMUSCULAR REEDUCATION: CPT | Performed by: PHYSICAL THERAPIST

## 2024-11-13 NOTE — LETTER
2024    Neftali Arriaza, MAK  [address]   [address]  [facility] [address]    Patient: Mary Narayan   YOB: 1965   Date of Visit: 2024     Encounter Diagnosis     ICD-10-CM    1. Foot pain, left  M79.672       2. Chronic pain of left ankle  M25.572     G89.29       3. Lumbar radiculopathy  M54.16           Dear Dr. Arriaza:    Thank you for your recent referral of Mary Narayan. Please review the attached evaluation summary from Mary's recent visit.     Please verify that you agree with the plan of care by signing the attached order.     If you have any questions or concerns, please do not hesitate to call.     I sincerely appreciate the opportunity to share in the care of one of your patients and hope to have another opportunity to work with you in the near future.       Sincerely,    Abhi Story, PT      Referring Provider:      I certify that I have read the below Plan of Care and certify the need for these services furnished under this plan of treatment while under my care.                    Neftali Arriaza, MAK  [address]   [address]  [facility] [address]  Via Fax: [phone]          PT Evaluation     Today's date: 2024  Patient name: Mary Narayan  : 1965  MRN: [id number]  Referring provider: Neftali Arriaza*  Dx:   Encounter Diagnosis     ICD-10-CM    1. Foot pain, left  M79.672       2. Chronic pain of left ankle  M25.572     G89.29       3. Lumbar radiculopathy  M54.16                      Assessment  Impairments: abnormal gait, abnormal or restricted ROM, activity intolerance, impaired balance, impaired physical strength, lacks appropriate home exercise program, pain with function, poor posture , poor body mechanics, activity limitations and endurance    Assessment details: Pt presents with signs and symptoms synonymous of admitting diagnosis as well as possible lumbar radiculopathy with mechanical  diagnosis of ext preference and DP of REIL.  Ability to abolish SL HR, PT OP DF and squat pain demonstrated on today's session confirmation of this.  Pt presents with pain, decreased strength, decreased range, flexibility, as well as tolerance to activity and postural awareness.  Pt would benefit skilled PT intervention in order to address these impairments in order to be able to perform all desired activities with minimal to nil symptom exacerbation.  Thank you very much for this referral, if they fail to find improvement over the next 3-4 weeks, appropriate referral will be performed.       Understanding of Dx/Px/POC: good     Prognosis: good    Goals  STG 4 Weeks:  Decrease pain at worst to 3  Improve range to LS to nil, DF to 15  Improve strength to Improve L LE to 4/5  Independent with HEP  LTG 8 Weeks:  Decrease pain at worst to 1  Improve range to perform squat without   Improve strength to L LE to 5/5.   Able to perform all desired activities with minimal to nil symptom exacerbation      Plan  Patient would benefit from: skilled physical therapy  Planned modality interventions: cryotherapy and thermotherapy: hydrocollator packs    Planned therapy interventions: activity modification, balance, behavior modification, body mechanics training, joint mobilization, manual therapy, neuromuscular re-education, postural training, strengthening, stretching, therapeutic activities, therapeutic exercise, therapeutic training, transfer training, IADL retraining, home exercise program, graded motor, graded exercise, graded activity, gait training, functional ROM exercises and flexibility    Frequency: 2x week  Duration in weeks: 10  Treatment plan discussed with: patient        Subjective Evaluation    History of Present Illness  Date of onset: 11/13/2024  Mechanism of injury: Pt is a 59 yofemale who is presents today stating that she developed L foot pain about 6 months ago.  She plays [employer] 3xs a week.  States that  the pain started at the ball of her foot, and reached all the way to the back of her ankle on the inside.  Pt reports that she was having difficulty walking and decided to finally see a specialist. She met with Dr. Arriaza of podiatry who gave her anti-inflammatory which assisted quite a bit.  Pt reports that the pain improved quite a bit since the initial performance of the medication.  Pt reports that her pain levels have gone to 3-4/10 at worst.  Pt reports that the pain when it does return is sharp.  It happens with the quick movements required in [employer].  Pt reports that walking is okay, stairs are okay she is aware of the symptoms, occasionally with jumping, minimal with running, but when she lands after serving and flattens the foot her symptoms will occur at their worst.  Pt reports that these symptoms will linger until after a match, and then stay with her until performance of medication.  Pt reports that she did suffer from a R ankle sprain last year, no pain, just feels weak.  Pt reports that she has had L Glute and back symptoms.  Pt reports that it can go to the outside of her L calf.  Pt reports that she has had PT in the past for this mild improvement, but finds massage assists the most with the her L glute pain.  Denies recent change in bowel or bladder. Sleeping used to be impaired due to back pain, but has not any more.  Pt reports that her goals are to reduce pain, improve ability to land when playing [employer], and gain strength, balance, and get back to how her Ankle/Foot Complex was 7 months.  Pt reports back to Dr. Arriaza  Quality of life: good    Patient Goals  Patient goals for therapy: increased strength, return to sport/leisure activities, increased motion, decreased pain and improved balance    Pain  Current pain ratin  At best pain ratin  At worst pain ratin  Quality: dull ache, sharp and tight  Relieving factors: change in position, medications and rest  Aggravating  "factors: running ([place])  Progression: improved      Diagnostic Tests  No diagnostic tests performed  Treatments  Previous treatment: physical therapy and medication        Objective     Active Range of Motion   Left Ankle/Foot   Dorsiflexion (ke): 15 degrees with pain  Great toe extension: 75 degrees     Right Ankle/Foot   Dorsiflexion (ke): 15 degrees   Plantar flexion: 60 degrees   Great toe extension: 80 degrees     Additional Active Range of Motion Details  Forward head, rounded shoulders, Lordosis  Mild antalgia L LE.   B/L Sensation intact to L3,4,5,S1,S2  L < R  DTR [place] 2+ B/L [place] 1+L, R 2+  Postural correction L glute, Better  Repeated motion   Flex WFL  Ext Min.  Prone nil.  REIL C'ing to low back.  Improved L LE strength, SL HR, squat.   SB R nil  SB L Min  LE Strength  Hip   L Flex 3+ Ext Abd Add (rest 5/5)   R Flex Ext Abd Add (-)5/5.   LE Screen all 5/5 except L DF 4-, Gtoe toe 3+  SL L 10\" R 10\"  SL HR L 5* R 5  Joint Mobility  Palpation  Sts  Pain with R HR.    Pain with DF OP at MTP 1.    Squat pain at Heal.    + Slump L.      LE Repeated Movements        Flowsheet Rows      Flowsheet Row Most Recent Value   PT/OT G-Codes    Current Score 57   Projected Score 68               Precautions: Fall Hx.       Manuals 11/13                                                                Neuro Re-Ed             Postural Ed and progression 10 min            Cut Finger/Bank Account/Obstruction education/centralization performed                                                                             Ther Ex             REIL  C'ing 4 x 10            Progression?                                       KWADWO?                                       Slump Slider L 1\"            Ther Activity             SL HR L             Squat             L Hip Flex/Gtoe/DF MMT 3+/4-/3+ to 5            Other concerns?             Check on [employer] activities  nv            Modalities             CP/HP Prn           "

## 2024-11-13 NOTE — PROGRESS NOTES
PT Evaluation     Today's date: 2024  Patient name: Mary Narayan  : 1965  MRN: [id number]  Referring provider: Neftali Arriaza*  Dx:   Encounter Diagnosis     ICD-10-CM    1. Foot pain, left  M79.672       2. Chronic pain of left ankle  M25.572     G89.29       3. Lumbar radiculopathy  M54.16                      Assessment  Impairments: abnormal gait, abnormal or restricted ROM, activity intolerance, impaired balance, impaired physical strength, lacks appropriate home exercise program, pain with function, poor posture , poor body mechanics, activity limitations and endurance    Assessment details: Pt presents with signs and symptoms synonymous of admitting diagnosis as well as possible lumbar radiculopathy with mechanical diagnosis of ext preference and DP of REIL.  Ability to abolish SL HR, PT OP DF and squat pain demonstrated on today's session confirmation of this.  Pt presents with pain, decreased strength, decreased range, flexibility, as well as tolerance to activity and postural awareness.  Pt would benefit skilled PT intervention in order to address these impairments in order to be able to perform all desired activities with minimal to nil symptom exacerbation.  Thank you very much for this referral, if they fail to find improvement over the next 3-4 weeks, appropriate referral will be performed.       Understanding of Dx/Px/POC: good     Prognosis: good    Goals  STG 4 Weeks:  Decrease pain at worst to 3  Improve range to LS to nil, DF to 15  Improve strength to Improve L LE to 4/5  Independent with HEP  LTG 8 Weeks:  Decrease pain at worst to 1  Improve range to perform squat without   Improve strength to L LE to 5/5.   Able to perform all desired activities with minimal to nil symptom exacerbation      Plan  Patient would benefit from: skilled physical therapy  Planned modality interventions: cryotherapy and thermotherapy: hydrocollator packs    Planned therapy interventions:  activity modification, balance, behavior modification, body mechanics training, joint mobilization, manual therapy, neuromuscular re-education, postural training, strengthening, stretching, therapeutic activities, therapeutic exercise, therapeutic training, transfer training, IADL retraining, home exercise program, graded motor, graded exercise, graded activity, gait training, functional ROM exercises and flexibility    Frequency: 2x week  Duration in weeks: 10  Treatment plan discussed with: patient        Subjective Evaluation    History of Present Illness  Date of onset: 11/13/2024  Mechanism of injury: Pt is a 59 yofemale who is presents today stating that she developed L foot pain about 6 months ago.  She plays [employer] 3xs a week.  States that the pain started at the ball of her foot, and reached all the way to the back of her ankle on the inside.  Pt reports that she was having difficulty walking and decided to finally see a specialist. She met with Dr. Arriaza of podiatry who gave her anti-inflammatory which assisted quite a bit.  Pt reports that the pain improved quite a bit since the initial performance of the medication.  Pt reports that her pain levels have gone to 3-4/10 at worst.  Pt reports that the pain when it does return is sharp.  It happens with the quick movements required in [place].  Pt reports that walking is okay, stairs are okay she is aware of the symptoms, occasionally with jumping, minimal with running, but when she lands after serving and flattens the foot her symptoms will occur at their worst.  Pt reports that these symptoms will linger until after a match, and then stay with her until performance of medication.  Pt reports that she did suffer from a R ankle sprain last year, no pain, just feels weak.  Pt reports that she has had L Glute and back symptoms.  Pt reports that it can go to the outside of her L calf.  Pt reports that she has had PT in the past for this mild improvement,  "but finds massage assists the most with the her L glute pain.  Denies recent change in bowel or bladder. Sleeping used to be impaired due to back pain, but has not any more.  Pt reports that her goals are to reduce pain, improve ability to land when playing [employer], and gain strength, balance, and get back to how her Ankle/Foot Complex was 7 months.  Pt reports back to Dr. Arriaza  Quality of life: good    Patient Goals  Patient goals for therapy: increased strength, return to sport/leisure activities, increased motion, decreased pain and improved balance    Pain  Current pain ratin  At best pain ratin  At worst pain ratin  Quality: dull ache, sharp and tight  Relieving factors: change in position, medications and rest  Aggravating factors: running ([place])  Progression: improved      Diagnostic Tests  No diagnostic tests performed  Treatments  Previous treatment: physical therapy and medication        Objective     Active Range of Motion   Left Ankle/Foot   Dorsiflexion (ke): 15 degrees with pain  Great toe extension: 75 degrees     Right Ankle/Foot   Dorsiflexion (ke): 15 degrees   Plantar flexion: 60 degrees   Great toe extension: 80 degrees     Additional Active Range of Motion Details  Forward head, rounded shoulders, Lordosis  Mild antalgia L LE.   B/L Sensation intact to L3,4,5,S1,S2  L < R  DTR [place] 2+ B/L [place] 1+L, R 2+  Postural correction L glute, Better  Repeated motion   Flex WFL  Ext Min.  Prone nil.  REIL C'ing to low back.  Improved L LE strength, SL HR, squat.   SB R nil  SB L Min  LE Strength  Hip   L Flex 3+ Ext Abd Add (rest /5)   R Flex Ext Abd Add (-)5/5.   LE Screen all 5/5 except L DF 4-, Gtoe toe 3+  SL L 10\" R 10\"  SL HR L 5* R 5  Joint Mobility  Palpation  Sts  Pain with R HR.    Pain with DF OP at MTP 1.    Squat pain at Heal.    + Slump L.      LE Repeated Movements        Flowsheet Rows      Flowsheet Row Most Recent Value   PT/OT G-Codes    Current Score 57 " "  Projected Score 68               Precautions: Fall Hx.       Manuals 11/13                                                                Neuro Re-Ed             Postural Ed and progression 10 min            Cut Finger/Bank Account/Obstruction education/centralization performed                                                                             Ther Ex             REIL  C'ing 4 x 10            Progression?                                       KWADWO?                                       Slump Slider L 1\"            Ther Activity             SL HR L             Squat             L Hip Flex/Gtoe/DF MMT 3+/4-/3+ to 5            Other concerns?             Check on Badminton activities  nv            Modalities             CP/HP Prn                               "

## 2024-11-20 ENCOUNTER — OFFICE VISIT (OUTPATIENT)
Dept: PHYSICAL THERAPY | Facility: CLINIC | Age: 59
End: 2024-11-20
Payer: COMMERCIAL

## 2024-11-20 DIAGNOSIS — M54.16 LUMBAR RADICULOPATHY: ICD-10-CM

## 2024-11-20 DIAGNOSIS — M79.672 FOOT PAIN, LEFT: Primary | ICD-10-CM

## 2024-11-20 DIAGNOSIS — G89.29 CHRONIC PAIN OF LEFT ANKLE: ICD-10-CM

## 2024-11-20 DIAGNOSIS — M25.572 CHRONIC PAIN OF LEFT ANKLE: ICD-10-CM

## 2024-11-20 PROCEDURE — 97530 THERAPEUTIC ACTIVITIES: CPT | Performed by: PHYSICAL THERAPIST

## 2024-11-20 PROCEDURE — 97110 THERAPEUTIC EXERCISES: CPT | Performed by: PHYSICAL THERAPIST

## 2024-11-20 PROCEDURE — 97112 NEUROMUSCULAR REEDUCATION: CPT | Performed by: PHYSICAL THERAPIST

## 2024-11-20 NOTE — PROGRESS NOTES
"Daily Note     Today's date: 2024  Patient name: Mary Narayan  : 1965  MRN: [id number]  Referring provider: Neftali Arriaza*  Dx:   Encounter Diagnosis     ICD-10-CM    1. Foot pain, left  M79.672       2. Chronic pain of left ankle  M25.572     G89.29       3. Lumbar radiculopathy  M54.16                      Subjective: Pt reports that her HEP only 2x's a day, but she has found mild improvement in pain in foot and down the leg, but improvement in strength.  Has been able to perform recreational exercise as well.  Having difficulty finding ability to perform HEP at work.        Objective: See treatment diary below  Mild low back pain.   Improved with REIL, KWADWO education improved with SL HR.  Full Loaded DF PF on slack     Assessment:  Reviewed centralization presentation, reviewed Produce, No worse pain education, reviewed HEP and proceeded with ext based intervention which pt does seem to have a factor on her SL HR and ability to jump off of L LE for badminton maneuvers.  Encouraged to perform this for HEP 4x's a day x 10.  Pt was in accord on top of her LS symptom interventions.        Precautions: Fall Hx.       Manuals                                                                Neuro Re-Ed             Postural Ed and progression 10 min 10 min assessment           Cut Finger/Bank Account/Obstruction education/centralization performed review                                                                            Ther Ex             REIL  C'ing 4 x 10 2 x 10           Progression?                                       KWADWO?  3 x 10                        Full Loaded DF PF on slack   4 x 10           Slump Slider L 1\" Nv?           Ther Activity             SL HR L  Mild pain.            Squat             L Hip Flex/Gtoe/DF MMT 3+/4-/3+ to 5 5/5           Other concerns?             Check on Badminton activities  nv Jump off L LE, better.            Modalities           "   CP/HP Prn

## 2024-11-27 ENCOUNTER — OFFICE VISIT (OUTPATIENT)
Dept: PHYSICAL THERAPY | Facility: CLINIC | Age: 59
End: 2024-11-27
Payer: COMMERCIAL

## 2024-11-27 DIAGNOSIS — G89.29 CHRONIC PAIN OF LEFT ANKLE: ICD-10-CM

## 2024-11-27 DIAGNOSIS — M54.16 LUMBAR RADICULOPATHY: ICD-10-CM

## 2024-11-27 DIAGNOSIS — M25.572 CHRONIC PAIN OF LEFT ANKLE: ICD-10-CM

## 2024-11-27 DIAGNOSIS — M79.672 FOOT PAIN, LEFT: Primary | ICD-10-CM

## 2024-11-27 PROCEDURE — 97112 NEUROMUSCULAR REEDUCATION: CPT | Performed by: PHYSICAL THERAPIST

## 2024-11-27 PROCEDURE — 97140 MANUAL THERAPY 1/> REGIONS: CPT | Performed by: PHYSICAL THERAPIST

## 2024-11-27 PROCEDURE — 97110 THERAPEUTIC EXERCISES: CPT | Performed by: PHYSICAL THERAPIST

## 2024-11-27 NOTE — PROGRESS NOTES
"Daily Note     Today's date: 2024  Patient name: Mary Narayan  : 1965  MRN: [id number]  Referring provider: Neftali Arriaza*  Dx:   Encounter Diagnosis     ICD-10-CM    1. Foot pain, left  M79.672       2. Chronic pain of left ankle  M25.572     G89.29       3. Lumbar radiculopathy  M54.16                      Subjective: Pt reports her back ha been feeling much better.  Mild issue after a very intense day of badminton last week, but states that the new HEP helps with her symptoms.  Only performing HEP 2xs a day.         Objective: See treatment diary below    Assessment:  Responding positively to REIL for LS symptoms.  Also responding positively to HEP of ankle, encouraged to perform HEP more frequently before and after exercise/sporting session.  Pt in accord.       Precautions: Fall Hx.       Manuals           Ankle DF + Gtoe   TAS                                                 Neuro Re-Ed             Postural Ed and progression 10 min 10 min assessment           Cut Finger/Bank Account/Obstruction education/centralization performed review           SLS L EC   EC x 4 x 20\"           SLS L Foam   4 x 20\"                                                 Ther Ex             REIL  C'ing 4 x 10 2 x 10 2 x 10          Progression?                                       KWADWO?  3 x 10 3 x 10                       Full Loaded DF PF on slack   4 x 10 4 x 10          Slump Slider L 1\" Nv? 1\" x 10          Ther Activity             SL HR L  Mild pain.  nil          Squat             L Hip Flex/Gtoe/DF MMT 3+/4-/3+ to 5 5/5 5          Other concerns?             Check on Badminton activities  nv Jump off L LE, better.  nil          Modalities             CP/HP Prn                               "

## 2024-12-03 ENCOUNTER — APPOINTMENT (OUTPATIENT)
Dept: PHYSICAL THERAPY | Facility: CLINIC | Age: 59
End: 2024-12-03
Payer: COMMERCIAL

## 2024-12-04 ENCOUNTER — APPOINTMENT (OUTPATIENT)
Dept: PHYSICAL THERAPY | Facility: CLINIC | Age: 59
End: 2024-12-04
Payer: COMMERCIAL

## 2024-12-11 ENCOUNTER — APPOINTMENT (OUTPATIENT)
Dept: PHYSICAL THERAPY | Facility: CLINIC | Age: 59
End: 2024-12-11
Payer: COMMERCIAL

## 2024-12-18 ENCOUNTER — EVALUATION (OUTPATIENT)
Dept: PHYSICAL THERAPY | Facility: CLINIC | Age: 59
End: 2024-12-18
Payer: COMMERCIAL

## 2024-12-18 DIAGNOSIS — G89.29 CHRONIC PAIN OF LEFT ANKLE: ICD-10-CM

## 2024-12-18 DIAGNOSIS — M54.16 LUMBAR RADICULOPATHY: ICD-10-CM

## 2024-12-18 DIAGNOSIS — M25.572 CHRONIC PAIN OF LEFT ANKLE: ICD-10-CM

## 2024-12-18 DIAGNOSIS — M79.672 FOOT PAIN, LEFT: Primary | ICD-10-CM

## 2024-12-18 PROCEDURE — 97112 NEUROMUSCULAR REEDUCATION: CPT | Performed by: PHYSICAL THERAPIST

## 2024-12-18 PROCEDURE — 97110 THERAPEUTIC EXERCISES: CPT | Performed by: PHYSICAL THERAPIST

## 2024-12-18 NOTE — PROGRESS NOTES
PT Evaluation     Today's date: 2024  Patient name: Mary Narayan  : 1965  MRN: [id number]  Referring provider: Neftali Arriaza*  Dx:   Encounter Diagnosis     ICD-10-CM    1. Foot pain, left  M79.672       2. Chronic pain of left ankle  M25.572     G89.29       3. Lumbar radiculopathy  M54.16                        Assessment  Impairments: abnormal gait, abnormal or restricted ROM, activity intolerance, impaired balance, impaired physical strength, lacks appropriate home exercise program, pain with function, poor posture , poor body mechanics, activity limitations and endurance    Assessment details: Pt at this time demonstrates improved range, strength, flexibility as well as overall tolerance to activity.  Pt at this time is likely safe to DC to HEP as she has met all goals except for pain at worse which is well controlled and pt's understanding of her presentation is strong.  If she is to have a decline in any form she is welcome to return as needed.  Thank you very much for this kind and motivated referral.    Understanding of Dx/Px/POC: good     Prognosis: good    Goals  STG 4 Weeks:  Decrease pain at worst to 3 -met  Improve range to LS to nil, DF to 15 -met  Improve strength to Improve L LE to 4/5 -met  Independent with HEP -met  LTG 8 Weeks:  Decrease pain at worst to 1 -met  Improve range to perform squat without  -met  Improve strength to L LE to 5/5.  -met  Able to perform all desired activities with minimal to nil symptom exacerbation -met      Plan  Patient would benefit from: skilled physical therapy  Planned modality interventions: cryotherapy and thermotherapy: hydrocollator packs    Planned therapy interventions: activity modification, balance, behavior modification, body mechanics training, joint mobilization, manual therapy, neuromuscular re-education, postural training, strengthening, stretching, therapeutic activities, therapeutic exercise, therapeutic training, transfer  training, IADL retraining, home exercise program, graded motor, graded exercise, graded activity, gait training, functional ROM exercises and flexibility    Frequency: 2x week  Duration in weeks: 10  Treatment plan discussed with: patient        Subjective Evaluation    History of Present Illness  Date of onset: 2024  Mechanism of injury: Pt presents today stating that she has just returned from vacation where she climbed 2 mountains.  She reports that she had mild irritation but did not require her to take medication.  Pt reports that as a whole she is feeling well.  Pt reports that pain at worst in the last week has been 2/10.  Pt reports that as long as she is exercising and moving, the ankle is feeling well, but if she sits for a few hours it can be irritable upon transition.  Pt reports her back is doing okay as a whole, less leg pain, some days better than others.  Pt reports that she has been able to play [employer] without significant issue.  Pt reports that she follows up with the referring tomorrow.  Would like to make today her last visit.    Quality of life: good    Patient Goals  Patient goals for therapy: increased strength, return to sport/leisure activities, increased motion, decreased pain and improved balance    Pain  Current pain ratin  At best pain ratin  At worst pain ratin  Quality: dull ache, sharp and tight  Relieving factors: change in position, medications and rest  Aggravating factors: running ([place])  Progression: improved      Diagnostic Tests  No diagnostic tests performed  Treatments  Previous treatment: physical therapy and medication        Objective     Active Range of Motion   Left Ankle/Foot   Dorsiflexion (ke): 20 degrees   Plantar flexion: 70 degrees   Great toe extension: 75 degrees     Right Ankle/Foot   Dorsiflexion (ke): 20 degrees   Plantar flexion: 60 degrees   Great toe extension: 80 degrees     Additional Active Range of Motion Details  Forward  "head, rounded shoulders, Lordosis  Mild antalgia L LE.   B/L Sensation intact to L3,4,5,S1,S2  L < R  DTR [place] 2+ B/L [place] 1+L, R 2+  Postural correction L glute, Better  Repeated motion   Flex WFL  Ext Min.  Prone nil.  REIL C'ing to low back.  Improved L LE strength, SL HR, squat.  (Remains)  ROM nil  SB R nil  SB L nil  LE Strength  Hip   L Flex 3+ Ext Abd Add (rest 5/5)  all 5/5.   R Flex Ext Abd Add (-)5/5.   LE Screen all 5/5 except L DF 4-, Gtoe toe 3+  (all 5/5.   SL L 10\" R 10\"  SL HR L 5* R 5  Joint Mobility  Palpation  Sts  Pain with R HR.    Pain with DF OP at MTP 1.    Squat pain at Heal.    + Slump L.      LE Repeated Movements                 Precautions: Fall Hx.       Manuals 11/13 11/20 11/27 12/18         Ankle DF + Gtoe   TAS                                                 Neuro Re-Ed             Postural Ed and progression 10 min 10 min assessment  10 min assessment         Cut Finger/Bank Account/Obstruction education/centralization performed review           SLS L EC   EC x 4 x 20\"           SLS L Foam   4 x 20\"                                                 Ther Ex             REIL  C'ing 4 x 10 2 x 10 2 x 10 2 x 10         Progression?                                       KWADWO?  3 x 10 3 x 10 3 x 10                      Full Loaded DF PF on slack   4 x 10 4 x 10 4 x 10         Slump Slider L 1\" Nv? 1\" x 10          Ther Activity             SL HR L  Mild pain.  nil          Squat             L Hip Flex/Gtoe/DF MMT 3+/4-/3+ to 5 5/5 5 5         Other concerns?             Check on Badminton activities  nv Jump off L LE, better.  nil nil         Modalities             CP/HP Prn                               "

## 2024-12-26 ENCOUNTER — APPOINTMENT (OUTPATIENT)
Dept: PHYSICAL THERAPY | Facility: CLINIC | Age: 59
End: 2024-12-26
Payer: COMMERCIAL

## 2025-04-14 ENCOUNTER — OFFICE VISIT (OUTPATIENT)
Dept: INTERNAL MEDICINE CLINIC | Facility: CLINIC | Age: 60
End: 2025-04-14
Payer: COMMERCIAL

## 2025-04-14 VITALS
BODY MASS INDEX: 22.68 KG/M2 | DIASTOLIC BLOOD PRESSURE: 68 MMHG | SYSTOLIC BLOOD PRESSURE: 110 MMHG | HEART RATE: 68 BPM | OXYGEN SATURATION: 97 % | WEIGHT: 126 LBS

## 2025-04-14 DIAGNOSIS — M54.16 LUMBAR RADICULOPATHY: Primary | ICD-10-CM

## 2025-04-14 PROCEDURE — 99214 OFFICE O/P EST MOD 30 MIN: CPT | Performed by: INTERNAL MEDICINE

## 2025-04-14 NOTE — PROGRESS NOTES
Name: Mary Narayan      : 1965      MRN: [id number]  Encounter Provider: Lea Reyes, MD  Encounter Date: 2025   Encounter department: [facility]    Assessment & Plan  Lumbar radiculopathy  Worsening pain, failed PT and HEP  Obtain XR and schedule MRI  Orders:  •  MRI lumbar spine wo contrast; Future  •  XR spine lumbar minimum 4 views non injury; Future      Depression Screening and Follow-up Plan: Patient was screened for depression during today's encounter. They screened negative with a PHQ-2 score of 0.          History of Present Illness     Chronic left lower back pain sometimes travels down the left foot  Described as soreness, constant pain, 4/10  Feels better with walking  She has been to PT without improvement of the pain  8 weeks ago at yoga , strained the left neck down the left shoulder  Pain might have started in the shoulder itself  This is improving          Back Pain  This is a chronic problem. The current episode started more than 1 year ago. The problem occurs constantly. The problem has been gradually worsening since onset. The pain is present in the lumbar spine. The quality of the pain is described as aching. The pain radiates to the left foot. The pain is at a severity of 4/10. The symptoms are aggravated by sitting, lying down and standing. Pertinent negatives include no abdominal pain, bladder incontinence, bowel incontinence, chest pain, fever or perianal numbness. She has tried home exercises for the symptoms. The treatment provided no relief.     Review of Systems   Constitutional:  Negative for fever and unexpected weight change.   Respiratory:  Negative for shortness of breath.    Cardiovascular:  Negative for chest pain and palpitations.   Gastrointestinal:  Negative for abdominal pain and bowel incontinence.   Genitourinary:  Negative for bladder incontinence.   Musculoskeletal:  Positive for back pain and neck pain.     Past Medical History:    Diagnosis Date   • Allergic 3/4/2021    sneeze, watery eye   • Bug bite 2020   • Callus    • High arches    • Plantar fasciitis    • Rash 2020   • Shin splints    • Upper respiratory tract infection 2024   • Varicella    • Verruca      Past Surgical History:   Procedure Laterality Date   •  SECTION     • POLYPECTOMY      Sinus   • ME HYSTEROSCOPY BX ENDOMETRIUM&/POLYPC W/WO D&C N/A 2021    Procedure: DILATATION AND CURETTAGE (D&C) WITH HYSTEROSCOPY (MYOSURE);  Surgeon: Maggie Squires MD;  Location: [facility];  Service: Gynecology   • SINUS SURGERY       Family History   Problem Relation Age of Onset   • Hypertension Mother    • Hypertension Father    • Snoring Father    • No Known Problems Daughter    • No Known Problems Son    • No Known Problems Maternal Grandmother    • No Known Problems Maternal Grandfather    • No Known Problems Paternal Grandmother    • No Known Problems Paternal Grandfather      Social History     Tobacco Use   • Smoking status: Never     Passive exposure: Never   • Smokeless tobacco: Never   • Tobacco comments:     Never used   Vaping Use   • Vaping status: Never Used   Substance and Sexual Activity   • Alcohol use: Never   • Drug use: Never   • Sexual activity: Yes     Partners: Male     Birth control/protection: None     Current Outpatient Medications on File Prior to Visit   Medication Sig   • benzonatate (TESSALON) 200 MG capsule Take 1 capsule (200 mg total) by mouth 3 (three) times a day as needed for cough   • calcium carbonate (OS-ARMANDO) 600 MG tablet Take 600 mg by mouth 2 (two) times a day with meals   • Fluticasone Propionate (Xhance) 93 MCG/ACT EXHU 1 actuation into each nostril 2 (two) times a day   • Glucosamine-Chondroitin (MOVE FREE PO) Take by mouth     Allergies   Allergen Reactions   • Sulfa Antibiotics Rash   • Other Allergic Rhinitis     Seasonal allergies     Immunization History   Administered Date(s) Administered   • COVID-19  PFIZER VACCINE 0.3 ML IM 04/20/2021, 05/13/2021, 01/02/2022   • INFLUENZA 10/18/2018, 10/10/2019, 11/04/2022   • Tdap 03/10/2023   • Zoster Vaccine Recombinant 03/10/2023     Objective   /68 (BP Location: Right arm, Patient Position: Sitting, Cuff Size: Standard)   Pulse 68   Wt 57.2 kg (126 lb)   SpO2 97%   BMI 22.68 kg/m²     Physical Exam  Constitutional:       Appearance: She is not ill-appearing.   Cardiovascular:      Rate and Rhythm: Regular rhythm.      Heart sounds: Normal heart sounds.   Pulmonary:      Effort: No respiratory distress.   Abdominal:      Palpations: Abdomen is soft.      Tenderness: There is no abdominal tenderness.   Musculoskeletal:      Right shoulder: Normal range of motion.      Cervical back: No bony tenderness. No pain with movement. Normal range of motion.      Lumbar back: No bony tenderness. Negative right straight leg raise test and negative left straight leg raise test.      Right lower leg: No edema.      Left lower leg: No edema.      Comments: 5/5 bilateral LE   Psychiatric:         Mood and Affect: Mood normal.         Behavior: Behavior normal.

## 2025-04-18 ENCOUNTER — HOSPITAL ENCOUNTER (OUTPATIENT)
Dept: RADIOLOGY | Facility: HOSPITAL | Age: 60
Discharge: HOME | End: 2025-04-18
Payer: COMMERCIAL

## 2025-04-18 DIAGNOSIS — M54.16 LUMBAR RADICULOPATHY: ICD-10-CM

## 2025-04-18 PROCEDURE — 72110 X-RAY EXAM L-2 SPINE 4/>VWS: CPT

## 2025-04-20 PROBLEM — J06.9 UPPER RESPIRATORY TRACT INFECTION: Status: RESOLVED | Noted: 2024-02-26 | Resolved: 2025-04-20

## 2025-04-21 ENCOUNTER — TRANSCRIBE ORDERS (OUTPATIENT)
Facility: HOSPITAL | Age: 60
End: 2025-04-21

## 2025-04-21 ENCOUNTER — HOSPITAL ENCOUNTER (OUTPATIENT)
Dept: MAMMOGRAPHY | Facility: HOSPITAL | Age: 60
Discharge: HOME | End: 2025-04-21
Attending: OBSTETRICS & GYNECOLOGY
Payer: COMMERCIAL

## 2025-04-21 VITALS — HEIGHT: 63 IN | BODY MASS INDEX: 22.34 KG/M2 | WEIGHT: 126.1 LBS

## 2025-04-21 DIAGNOSIS — Z13.9 SCREENING FOR UNSPECIFIED CONDITION: Primary | ICD-10-CM

## 2025-04-21 DIAGNOSIS — Z12.31 ENCOUNTER FOR SCREENING MAMMOGRAM FOR MALIGNANT NEOPLASM OF BREAST: ICD-10-CM

## 2025-04-21 PROCEDURE — 77067 SCR MAMMO BI INCL CAD: CPT

## 2025-04-21 PROCEDURE — 77063 BREAST TOMOSYNTHESIS BI: CPT

## 2025-04-22 ENCOUNTER — RESULTS FOLLOW-UP (OUTPATIENT)
Dept: INTERNAL MEDICINE CLINIC | Facility: CLINIC | Age: 60
End: 2025-04-22

## 2025-04-28 ENCOUNTER — RESULTS FOLLOW-UP (OUTPATIENT)
Dept: OBGYN CLINIC | Facility: CLINIC | Age: 60
End: 2025-04-28

## 2025-05-15 ENCOUNTER — HOSPITAL ENCOUNTER (OUTPATIENT)
Dept: MRI IMAGING | Facility: HOSPITAL | Age: 60
Discharge: HOME | End: 2025-05-15
Attending: INTERNAL MEDICINE
Payer: COMMERCIAL

## 2025-05-15 DIAGNOSIS — M54.16 LUMBAR RADICULOPATHY: ICD-10-CM

## 2025-05-15 PROCEDURE — 72148 MRI LUMBAR SPINE W/O DYE: CPT

## 2025-07-15 ENCOUNTER — OFFICE VISIT (OUTPATIENT)
Dept: INTERNAL MEDICINE CLINIC | Facility: CLINIC | Age: 60
End: 2025-07-15
Payer: COMMERCIAL

## 2025-07-15 VITALS
DIASTOLIC BLOOD PRESSURE: 60 MMHG | HEART RATE: 58 BPM | BODY MASS INDEX: 22.93 KG/M2 | SYSTOLIC BLOOD PRESSURE: 102 MMHG | OXYGEN SATURATION: 98 % | WEIGHT: 129.4 LBS | HEIGHT: 63 IN

## 2025-07-15 DIAGNOSIS — Z23 ENCOUNTER FOR IMMUNIZATION: ICD-10-CM

## 2025-07-15 DIAGNOSIS — M54.16 LUMBAR RADICULOPATHY: ICD-10-CM

## 2025-07-15 DIAGNOSIS — Z00.00 ANNUAL PHYSICAL EXAM: Primary | ICD-10-CM

## 2025-07-15 PROCEDURE — 90471 IMMUNIZATION ADMIN: CPT

## 2025-07-15 PROCEDURE — 99396 PREV VISIT EST AGE 40-64: CPT | Performed by: INTERNAL MEDICINE

## 2025-07-15 PROCEDURE — 90750 HZV VACC RECOMBINANT IM: CPT

## 2025-08-05 ENCOUNTER — OFFICE VISIT (OUTPATIENT)
Age: 60
End: 2025-08-05
Payer: COMMERCIAL

## 2025-08-05 VITALS — WEIGHT: 129 LBS | BODY MASS INDEX: 22.86 KG/M2 | HEIGHT: 63 IN

## 2025-08-05 DIAGNOSIS — M54.16 LUMBAR RADICULOPATHY: ICD-10-CM

## 2025-08-05 DIAGNOSIS — G89.29 CHRONIC LEFT SI JOINT PAIN: Primary | ICD-10-CM

## 2025-08-05 DIAGNOSIS — M53.3 CHRONIC LEFT SI JOINT PAIN: Primary | ICD-10-CM

## 2025-08-05 PROCEDURE — 99203 OFFICE O/P NEW LOW 30 MIN: CPT | Performed by: ORTHOPAEDIC SURGERY

## 2025-08-05 RX ORDER — CHLORHEXIDINE GLUCONATE ORAL RINSE 1.2 MG/ML
SOLUTION DENTAL
COMMUNITY
Start: 2025-07-31

## 2025-08-05 RX ORDER — CLINDAMYCIN HYDROCHLORIDE 150 MG/1
CAPSULE ORAL
COMMUNITY
Start: 2025-07-31

## (undated) DEVICE — PVC URETHRAL CATHETER: Brand: DOVER

## (undated) DEVICE — MAYO STAND COVER: Brand: CONVERTORS

## (undated) DEVICE — STERILE SURGICAL LUBRICANT,  TUBE: Brand: SURGILUBE

## (undated) DEVICE — MYOSURE SEAL SET

## (undated) DEVICE — PACK FLUENT DISP

## (undated) DEVICE — UNDER BUTTOCKS DRAPE W/FLUID CONTROL POUCH: Brand: CONVERTORS

## (undated) DEVICE — LIGHT HANDLE COVER SLEEVE DISP BLUE STELLAR

## (undated) DEVICE — CHLORHEXIDINE 4PCT 4 OZ

## (undated) DEVICE — DEVICE MYOSURE LITE TISSUE REMOVAL HYSTEROSCOPIC

## (undated) DEVICE — STRL ALLENTOWN HYSTEROSCOPY PK: Brand: CARDINAL HEALTH

## (undated) DEVICE — SPONGE LAP 18 X 18 IN STRL RFD

## (undated) DEVICE — GLOVE INDICATOR PI UNDERGLOVE SZ 6.5 BLUE

## (undated) DEVICE — TUBE OUTFLOW F/FLUID CONTROL SYSTEM AQUILEX

## (undated) DEVICE — MAXI PAD5.51 X 13.78 IN. (14.0 X 35.0 CM)HEAVYCONTOUREDUNSCENTED: Brand: CURITY

## (undated) DEVICE — GLOVE PI ULTRA TOUCH SZ.6.5

## (undated) DEVICE — PREMIUM DRY TRAY LF: Brand: MEDLINE INDUSTRIES, INC.

## (undated) DEVICE — TUBE INFLOW F/FLUID CONTROL SYSTEM AQUILEX